# Patient Record
Sex: MALE | Race: ASIAN | NOT HISPANIC OR LATINO | URBAN - METROPOLITAN AREA
[De-identification: names, ages, dates, MRNs, and addresses within clinical notes are randomized per-mention and may not be internally consistent; named-entity substitution may affect disease eponyms.]

---

## 2018-07-10 ENCOUNTER — HOSPITAL ENCOUNTER (OUTPATIENT)
Dept: RADIOLOGY | Facility: HOSPITAL | Age: 53
Discharge: HOME | End: 2018-07-10
Attending: INTERNAL MEDICINE
Payer: COMMERCIAL

## 2018-07-10 ENCOUNTER — OFFICE VISIT (OUTPATIENT)
Dept: INTERNAL MEDICINE | Facility: CLINIC | Age: 53
End: 2018-07-10
Payer: COMMERCIAL

## 2018-07-10 VITALS
HEART RATE: 63 BPM | HEIGHT: 70 IN | SYSTOLIC BLOOD PRESSURE: 104 MMHG | WEIGHT: 149.06 LBS | OXYGEN SATURATION: 99 % | DIASTOLIC BLOOD PRESSURE: 64 MMHG | BODY MASS INDEX: 21.34 KG/M2

## 2018-07-10 DIAGNOSIS — Z00.00 ROUTINE PHYSICAL EXAMINATION: Primary | ICD-10-CM

## 2018-07-10 DIAGNOSIS — K25.9 MULTIPLE GASTRIC ULCERS: ICD-10-CM

## 2018-07-10 DIAGNOSIS — Z00.00 ROUTINE PHYSICAL EXAMINATION: ICD-10-CM

## 2018-07-10 DIAGNOSIS — E78.5 DYSLIPIDEMIA: ICD-10-CM

## 2018-07-10 PROCEDURE — 71046 X-RAY EXAM CHEST 2 VIEWS: CPT | Mod: 26,,, | Performed by: RADIOLOGY

## 2018-07-10 PROCEDURE — 99999 PR PBB SHADOW E&M-NEW PATIENT-LVL III: CPT | Mod: PBBFAC,,, | Performed by: INTERNAL MEDICINE

## 2018-07-10 PROCEDURE — 71046 X-RAY EXAM CHEST 2 VIEWS: CPT | Mod: TC

## 2018-07-10 PROCEDURE — 99386 PREV VISIT NEW AGE 40-64: CPT | Mod: S$GLB,,, | Performed by: INTERNAL MEDICINE

## 2018-07-10 RX ORDER — PANTOPRAZOLE SODIUM 40 MG/1
40 TABLET, DELAYED RELEASE ORAL DAILY
Qty: 90 TABLET | Refills: 4 | Status: SHIPPED | OUTPATIENT
Start: 2018-07-10 | End: 2019-12-31

## 2018-07-10 RX ORDER — PANTOPRAZOLE SODIUM 40 MG/1
40 TABLET, DELAYED RELEASE ORAL DAILY
COMMUNITY
End: 2018-07-10 | Stop reason: SDUPTHER

## 2018-07-10 RX ORDER — ATORVASTATIN CALCIUM 20 MG/1
20 TABLET, FILM COATED ORAL DAILY
COMMUNITY
End: 2018-07-10 | Stop reason: SDUPTHER

## 2018-07-10 RX ORDER — ATORVASTATIN CALCIUM 20 MG/1
20 TABLET, FILM COATED ORAL DAILY
Qty: 90 TABLET | Refills: 4 | Status: SHIPPED | OUTPATIENT
Start: 2018-07-10 | End: 2019-12-31 | Stop reason: SDUPTHER

## 2018-07-11 NOTE — PROGRESS NOTES
Subjective:       Patient ID: Alonzo Turk is a 53 y.o. male.    Chief Complaint: Annual Exam    HPI:  53-year-old male comes in for visit to establish care and for general physical.  He is a 53-year-old [place]ian male new to our system.  I care for his sister and mother.  He is attended by his sister and brother-in-law although he does speak anguish very well.  He brings in records from his gastroenterologist which includes a colonoscopy and upper endoscopy.  He also brings in labs from a the recent doctor's visit but that doctor has retired.  He says he feels well today but would like some follow-up.  He is 53 years old and had an upper and lower endoscopy in the last 2 months.  The lower scope was for screening and apparently showed a benign polyp.  I trying to get the records but he was told his next colonoscopy would be in 10 years.  I suspect it was a hyperplastic polyp but will try to get that record.  Of note is the patient was having questionable severity acid reflux or indigestion symptoms.  He rarely used [place] but did upper endoscopy was done which showed segmental ulceration and nodularity of the mucosa of the stomach at the antrum.  These were compatible with erosive gastritis.  He also was noted to have multiple superficial ulcers in the duodenum ranging from 0.5 cm to 0.3 cm.  Patient and family tell me this was negative for H pylori but again I would like to see those reports.  He was placed on a PPI and told to stop anti-inflammatories and was supposed to have a 6 week follow-up with his gastroenterologist.  At that visit he was allegedly told he did not need another scope and the colonoscopy was in 10 years.  I would like to get those records because if he had several ulcers it may be worth repeating the scope to make sure they have healed.  He denies any other active or acute complaints.  He had fasting labs the 3 months ago and his glucose was slightly high at 104.  Kidney function was normal,  liver function normal.  Cholesterol was 141 with HDL 62, LDL 66.  This had apparently dropped from 170 prior starting Lipitor.  I will update some labs and place copies of these in his medical record.      Review of Systems   Constitutional: Negative for chills, fatigue, fever and unexpected weight change.   HENT: Negative for ear pain, nosebleeds, sore throat and trouble swallowing.    Eyes: Negative for pain and visual disturbance.   Respiratory: Negative for cough, shortness of breath and wheezing.    Cardiovascular: Negative for chest pain, palpitations and leg swelling.   Gastrointestinal: Negative for abdominal pain, constipation, diarrhea, nausea and vomiting.   Genitourinary: Negative for difficulty urinating, frequency and hematuria.   Musculoskeletal: Negative for arthralgias, back pain, myalgias, neck pain and neck stiffness.   Skin: Negative for rash and wound.   Neurological: Negative for dizziness, numbness and headaches.   Hematological: Negative for adenopathy. Does not bruise/bleed easily.   Psychiatric/Behavioral: Negative for dysphoric mood, sleep disturbance and suicidal ideas. The patient is not nervous/anxious.        Objective:      Physical Exam   Constitutional: He is oriented to person, place, and time. He appears well-developed and well-nourished. No distress.   Thin Lithuanian male in no acute distress   HENT:   Head: Normocephalic and atraumatic.   Right Ear: External ear normal.   Left Ear: External ear normal.   Mouth/Throat: Oropharynx is clear and moist. No oropharyngeal exudate.   TM's clear, pharynx clear   Eyes: Conjunctivae and EOM are normal. Pupils are equal, round, and reactive to light. No scleral icterus.   Neck: Normal range of motion. Neck supple. No thyromegaly present.   No supraclavicular nodes palpated   Cardiovascular: Normal rate, regular rhythm and normal heart sounds.    No murmur heard.  Pulmonary/Chest: Effort normal and breath sounds normal. He has no wheezes.    Abdominal: Soft. Bowel sounds are normal. He exhibits no mass. There is no tenderness.   Musculoskeletal: He exhibits no edema.   Lymphadenopathy:     He has no cervical adenopathy.   Neurological: He is alert and oriented to person, place, and time.   Skin: No rash noted. No erythema. No pallor.   Psychiatric: He has a normal mood and affect. His behavior is normal.   Vitals reviewed.      Assessment:       1. Routine physical examination    2. Multiple gastric ulcers    3. Dyslipidemia        Plan:       Alonzo was seen today for annual exam.    Diagnoses and all orders for this visit:    Routine physical examination  -     Lipid panel; Future  -     PSA, Screening; Future  -     CBC auto differential; Future  -     Comprehensive metabolic panel; Future  -     Hemoglobin A1c; Future  -     X-Ray Chest PA And Lateral; Future    Multiple gastric ulcers  -     X-Ray Chest PA And Lateral; Future    Dyslipidemia  -     X-Ray Chest PA And Lateral; Future    Other orders  -     pantoprazole (PROTONIX) 40 MG tablet; Take 1 tablet (40 mg total) by mouth once daily.  -     atorvastatin (LIPITOR) 20 MG tablet; Take 1 tablet (20 mg total) by mouth once daily.        Follow-up depending on above labs and chest x-ray.  I suggested he follow prior instructions to take PPI medication for 6 months.  We will decide on repeat after I reviewed the old records

## 2018-07-13 ENCOUNTER — LAB VISIT (OUTPATIENT)
Dept: LAB | Facility: HOSPITAL | Age: 53
End: 2018-07-13
Attending: INTERNAL MEDICINE
Payer: COMMERCIAL

## 2018-07-13 ENCOUNTER — TELEPHONE (OUTPATIENT)
Dept: INTERNAL MEDICINE | Facility: CLINIC | Age: 53
End: 2018-07-13

## 2018-07-13 DIAGNOSIS — Z00.00 ROUTINE PHYSICAL EXAMINATION: ICD-10-CM

## 2018-07-13 LAB
ALBUMIN SERPL BCP-MCNC: 4.1 G/DL
ALP SERPL-CCNC: 56 U/L
ALT SERPL W/O P-5'-P-CCNC: 35 U/L
ANION GAP SERPL CALC-SCNC: 7 MMOL/L
AST SERPL-CCNC: 28 U/L
BASOPHILS # BLD AUTO: 0.02 K/UL
BASOPHILS NFR BLD: 0.5 %
BILIRUB SERPL-MCNC: 1.1 MG/DL
BUN SERPL-MCNC: 10 MG/DL
CALCIUM SERPL-MCNC: 9.2 MG/DL
CHLORIDE SERPL-SCNC: 104 MMOL/L
CHOLEST SERPL-MCNC: 125 MG/DL
CHOLEST/HDLC SERPL: 2.8 {RATIO}
CO2 SERPL-SCNC: 29 MMOL/L
COMPLEXED PSA SERPL-MCNC: 0.56 NG/ML
CREAT SERPL-MCNC: 0.8 MG/DL
DIFFERENTIAL METHOD: ABNORMAL
EOSINOPHIL # BLD AUTO: 0.1 K/UL
EOSINOPHIL NFR BLD: 1.9 %
ERYTHROCYTE [DISTWIDTH] IN BLOOD BY AUTOMATED COUNT: 13.4 %
EST. GFR  (AFRICAN AMERICAN): >60 ML/MIN/1.73 M^2
EST. GFR  (NON AFRICAN AMERICAN): >60 ML/MIN/1.73 M^2
ESTIMATED AVG GLUCOSE: 111 MG/DL
GLUCOSE SERPL-MCNC: 98 MG/DL
HBA1C MFR BLD HPLC: 5.5 %
HCT VFR BLD AUTO: 42.5 %
HDLC SERPL-MCNC: 45 MG/DL
HDLC SERPL: 36 %
HGB BLD-MCNC: 14.4 G/DL
LDLC SERPL CALC-MCNC: 66.2 MG/DL
LYMPHOCYTES # BLD AUTO: 1.2 K/UL
LYMPHOCYTES NFR BLD: 30.4 %
MCH RBC QN AUTO: 29.9 PG
MCHC RBC AUTO-ENTMCNC: 33.9 G/DL
MCV RBC AUTO: 88 FL
MONOCYTES # BLD AUTO: 0.2 K/UL
MONOCYTES NFR BLD: 5.6 %
NEUTROPHILS # BLD AUTO: 2.3 K/UL
NEUTROPHILS NFR BLD: 61.6 %
NONHDLC SERPL-MCNC: 80 MG/DL
PLATELET # BLD AUTO: 207 K/UL
PMV BLD AUTO: 10.1 FL
POTASSIUM SERPL-SCNC: 4 MMOL/L
PROT SERPL-MCNC: 6.5 G/DL
RBC # BLD AUTO: 4.81 M/UL
SODIUM SERPL-SCNC: 140 MMOL/L
TRIGL SERPL-MCNC: 69 MG/DL
WBC # BLD AUTO: 3.78 K/UL

## 2018-07-13 PROCEDURE — 80053 COMPREHEN METABOLIC PANEL: CPT

## 2018-07-13 PROCEDURE — 85025 COMPLETE CBC W/AUTO DIFF WBC: CPT

## 2018-07-13 PROCEDURE — 80061 LIPID PANEL: CPT

## 2018-07-13 PROCEDURE — 84153 ASSAY OF PSA TOTAL: CPT

## 2018-07-13 PROCEDURE — 83036 HEMOGLOBIN GLYCOSYLATED A1C: CPT

## 2018-07-13 PROCEDURE — 36415 COLL VENOUS BLD VENIPUNCTURE: CPT

## 2018-07-17 ENCOUNTER — TELEPHONE (OUTPATIENT)
Dept: INTERNAL MEDICINE | Facility: CLINIC | Age: 53
End: 2018-07-17

## 2018-07-17 NOTE — TELEPHONE ENCOUNTER
Outside Gastro doctor rec C-scope in 5 years   No specific rec on EGD. Suggested PPI for short duration then stop. Follow up if symptoms recur.     Will scan info to chart.

## 2018-07-20 ENCOUNTER — TELEPHONE (OUTPATIENT)
Dept: INTERNAL MEDICINE | Facility: CLINIC | Age: 53
End: 2018-07-20

## 2018-11-16 ENCOUNTER — HOSPITAL ENCOUNTER (OUTPATIENT)
Dept: RADIOLOGY | Facility: HOSPITAL | Age: 53
Discharge: HOME | End: 2018-11-16
Attending: INTERNAL MEDICINE
Payer: COMMERCIAL

## 2018-11-16 ENCOUNTER — OFFICE VISIT (OUTPATIENT)
Dept: INTERNAL MEDICINE | Facility: CLINIC | Age: 53
End: 2018-11-16
Payer: COMMERCIAL

## 2018-11-16 VITALS
BODY MASS INDEX: 21.95 KG/M2 | OXYGEN SATURATION: 99 % | DIASTOLIC BLOOD PRESSURE: 80 MMHG | HEART RATE: 67 BPM | WEIGHT: 153 LBS | SYSTOLIC BLOOD PRESSURE: 120 MMHG

## 2018-11-16 DIAGNOSIS — K25.9 MULTIPLE GASTRIC ULCERS: Primary | ICD-10-CM

## 2018-11-16 DIAGNOSIS — M54.9 UPPER BACK PAIN: ICD-10-CM

## 2018-11-16 DIAGNOSIS — R20.0 LEFT ARM NUMBNESS: ICD-10-CM

## 2018-11-16 DIAGNOSIS — E78.5 DYSLIPIDEMIA: ICD-10-CM

## 2018-11-16 DIAGNOSIS — M54.2 NECK PAIN: ICD-10-CM

## 2018-11-16 PROCEDURE — 99214 OFFICE O/P EST MOD 30 MIN: CPT | Mod: S$GLB,,, | Performed by: INTERNAL MEDICINE

## 2018-11-16 PROCEDURE — 72040 X-RAY EXAM NECK SPINE 2-3 VW: CPT | Mod: TC

## 2018-11-16 PROCEDURE — 99999 PR PBB SHADOW E&M-EST. PATIENT-LVL III: CPT | Mod: PBBFAC,,, | Performed by: INTERNAL MEDICINE

## 2018-11-16 PROCEDURE — 72070 X-RAY EXAM THORAC SPINE 2VWS: CPT | Mod: 26,,, | Performed by: RADIOLOGY

## 2018-11-16 PROCEDURE — 3008F BODY MASS INDEX DOCD: CPT | Mod: CPTII,S$GLB,, | Performed by: INTERNAL MEDICINE

## 2018-11-16 PROCEDURE — 72070 X-RAY EXAM THORAC SPINE 2VWS: CPT | Mod: TC

## 2018-11-16 PROCEDURE — 72040 X-RAY EXAM NECK SPINE 2-3 VW: CPT | Mod: 26,,, | Performed by: RADIOLOGY

## 2018-11-16 NOTE — PROGRESS NOTES
Subjective:       Patient ID: Alonzo Turk is a 53 y.o. male.    Chief Complaint: Follow-up    HPI:  Patient comes in follow-up gastric ulcers.  He also is having some left upper back discomfort with mild pain and numbness that radiates to the left arm.  He had an outside scope but a copy is placed in our records here.  Showed multiple gastric ulcers.  He has been on Protonix for nearly 6 months and it was suggested that he repeat the scope.  He did not have symptoms beforehand as a comparison of symptoms so it is difficult to reliably say whether he is symptomatic or not.  He denies any GI or  complaints.  No emesis or dysphagia.  A he does not remember injuring his back or arm at work.  The symptoms seemed to radiate from approximately cervical 7 or 8 region in the left upper back down to the arm.  No trauma.  Normal  and strength.  He has not tried treating this in any way      Review of Systems   Constitutional: Negative for chills, fatigue, fever and unexpected weight change.   HENT: Negative for nosebleeds and trouble swallowing.    Eyes: Negative for pain and visual disturbance.   Respiratory: Negative for cough, shortness of breath and wheezing.    Cardiovascular: Negative for chest pain and palpitations.   Gastrointestinal: Negative for abdominal pain, constipation, diarrhea, nausea and vomiting.   Genitourinary: Negative for difficulty urinating and hematuria.   Musculoskeletal: Positive for arthralgias, back pain and myalgias. Negative for neck pain.   Skin: Negative for rash.   Neurological: Positive for numbness. Negative for dizziness, weakness and headaches.   Hematological: Does not bruise/bleed easily.   Psychiatric/Behavioral: Negative for dysphoric mood, sleep disturbance and suicidal ideas.       Objective:      Physical Exam   Constitutional: He is oriented to person, place, and time. He appears well-developed and well-nourished. No distress.   HENT:   Head: Normocephalic and atraumatic.    Right Ear: External ear normal.   Left Ear: External ear normal.   Mouth/Throat: Oropharynx is clear and moist. No oropharyngeal exudate.   TM's clear, pharynx clear   Eyes: Conjunctivae and EOM are normal. Pupils are equal, round, and reactive to light. No scleral icterus.   Neck: Normal range of motion. Neck supple. No thyromegaly present.   No supraclavicular nodes palpated   Cardiovascular: Normal rate, regular rhythm and normal heart sounds.   No murmur heard.  Pulmonary/Chest: Effort normal and breath sounds normal. He has no wheezes.   Abdominal: Soft. Bowel sounds are normal. He exhibits no mass. There is no tenderness. There is no guarding.   Musculoskeletal: He exhibits tenderness. He exhibits no edema or deformity.        Arms:  Area involved red has mild discomfort, mild tenderness to deep palpation of the upper back but fair range of motion of the shoulder elbow and wrist.  Good  strength.  No clear sensory deficit but patient says it feels numb   Lymphadenopathy:     He has no cervical adenopathy.   Neurological: He is alert and oriented to person, place, and time.   Skin: No rash noted. No erythema. No pallor.   Psychiatric: He has a normal mood and affect. His behavior is normal.       Assessment:       1. Multiple gastric ulcers    2. Dyslipidemia    3. Neck pain    4. Upper back pain    5. Left arm numbness        Plan:       Alonzo was seen today for follow-up.    Diagnoses and all orders for this visit:    Multiple gastric ulcers  -     Case request GI: ESOPHAGOGASTRODUODENOSCOPY (EGD)    Dyslipidemia    Neck pain  -     X-Ray Cervical Spine AP And Lateral; Future  -     X-Ray Thoracic Spine AP Lateral; Future    Upper back pain  -     X-Ray Cervical Spine AP And Lateral; Future  -     X-Ray Thoracic Spine AP Lateral; Future    Left arm numbness  -     X-Ray Cervical Spine AP And Lateral; Future  -     X-Ray Thoracic Spine AP Lateral; Future        trial of heat, Tylenol, massage, rest.  Call  if not improving

## 2018-11-19 ENCOUNTER — TELEPHONE (OUTPATIENT)
Dept: INTERNAL MEDICINE | Facility: CLINIC | Age: 53
End: 2018-11-19

## 2018-11-19 DIAGNOSIS — R20.0 LEFT ARM NUMBNESS: ICD-10-CM

## 2018-11-19 DIAGNOSIS — M79.602 LEFT ARM PAIN: Primary | ICD-10-CM

## 2018-11-20 NOTE — TELEPHONE ENCOUNTER
Please let pt or his sister know that his xrays did show mild arthritis. No fractures. If his left arm symptoms are continuing, we may wish to do further testing. Let me know of any questions.

## 2018-11-20 NOTE — TELEPHONE ENCOUNTER
----- Message from Antonia Delatorre sent at 11/20/2018 11:35 AM CST -----  Contact: Alonzo Turk [phone]  Patient is returning a phone call.  Who left a message for the patient: Audrey Lombard, LPN   Does patient know what this is regarding:  Test Results   Comments: Alonzo would like you guys to call his brother in-law Jem [phone]. Alonzo states the brother in-law handles all of their medical needs.

## 2018-11-20 NOTE — TELEPHONE ENCOUNTER
----- Message from Dina Ramirez sent at 11/20/2018  2:16 PM CST -----  Contact: self/[phone]  Patient is returning a phone call.  Who left a message for the patient: mian  Does patient know what this is regarding: results   Comments:

## 2018-11-21 NOTE — TELEPHONE ENCOUNTER
Pt informed. Pt requested me to give information to Jem his brother-in-law(He takes care of pt,s medical issues) and jem requested further testing and to schedule on any Friday.

## 2018-11-21 NOTE — TELEPHONE ENCOUNTER
Yes, pt continues with pain, maybe you were going to order a ct scan or another test  Or medication ??

## 2018-12-07 ENCOUNTER — TELEPHONE (OUTPATIENT)
Dept: INTERNAL MEDICINE | Facility: CLINIC | Age: 53
End: 2018-12-07

## 2018-12-07 ENCOUNTER — OFFICE VISIT (OUTPATIENT)
Dept: INTERNAL MEDICINE | Facility: CLINIC | Age: 53
End: 2018-12-07
Payer: COMMERCIAL

## 2018-12-07 VITALS
DIASTOLIC BLOOD PRESSURE: 80 MMHG | BODY MASS INDEX: 21.18 KG/M2 | HEART RATE: 77 BPM | SYSTOLIC BLOOD PRESSURE: 116 MMHG | OXYGEN SATURATION: 98 % | HEIGHT: 70 IN | WEIGHT: 147.94 LBS

## 2018-12-07 DIAGNOSIS — M54.12 CERVICAL RADICULOPATHY: Primary | ICD-10-CM

## 2018-12-07 DIAGNOSIS — M25.511 ACUTE PAIN OF RIGHT SHOULDER: ICD-10-CM

## 2018-12-07 DIAGNOSIS — M54.2 NECK PAIN: ICD-10-CM

## 2018-12-07 DIAGNOSIS — M79.602 LEFT ARM PAIN: ICD-10-CM

## 2018-12-07 PROCEDURE — 3008F BODY MASS INDEX DOCD: CPT | Mod: CPTII,S$GLB,, | Performed by: INTERNAL MEDICINE

## 2018-12-07 PROCEDURE — 99214 OFFICE O/P EST MOD 30 MIN: CPT | Mod: S$GLB,,, | Performed by: INTERNAL MEDICINE

## 2018-12-07 PROCEDURE — 99999 PR PBB SHADOW E&M-EST. PATIENT-LVL IV: CPT | Mod: PBBFAC,,, | Performed by: INTERNAL MEDICINE

## 2018-12-07 RX ORDER — GABAPENTIN 100 MG/1
100 CAPSULE ORAL 3 TIMES DAILY
Qty: 30 CAPSULE | Refills: 1 | Status: SHIPPED | OUTPATIENT
Start: 2018-12-07 | End: 2021-02-09

## 2018-12-07 NOTE — PROGRESS NOTES
Subjective:       Patient ID: Alonzo Turk is a 53 y.o. male.    Chief Complaint: Arm Pain    HPI:  53-year-old comes in for follow-up of neck pain and left arm numbness and tingling.  Unfortunately his nerve conduction study was not available to be scheduled until about 2 weeks from now and we tried to get that 2 weeks ago.  He try conservative measures and his neck x-ray showed some minor degenerative changes but not enough to cause symptoms into the left arm.  He mentions that last week he had right shoulder pain but it does not feel like the left.  It was with raising the arm or with movement and I suspect he strained the shoulder.  He does sometimes work as a .  He has a history of ulcers and we have discouraged anti-inflammatory use.  He has an upper GI scope for follow-up ulcers coming up in a few weeks.  We talked about trying gabapentin since this does seem neuropathic in nature.  He is not sure he wants to try that but will consider it.  At this point since the left-sided neck and arm burning type pain with numbness all the way down to the hand is persisting and possibly worsening I would like to have him get an MRI scan of the neck.  He and his family are in agreement      Review of Systems   Constitutional: Negative for activity change, chills, fatigue, fever and unexpected weight change.   HENT: Negative for hearing loss, nosebleeds, rhinorrhea and trouble swallowing.    Eyes: Negative for pain, discharge and visual disturbance.   Respiratory: Negative for cough, chest tightness, shortness of breath and wheezing.    Cardiovascular: Negative for chest pain and palpitations.   Gastrointestinal: Negative for abdominal pain, blood in stool, constipation, diarrhea, nausea and vomiting.   Endocrine: Negative for polydipsia and polyuria.   Genitourinary: Negative for difficulty urinating, hematuria and urgency.   Musculoskeletal: Positive for arthralgias (Right shoulder pain was short lived) and neck pain.    Skin: Negative for rash.   Neurological: Positive for weakness and numbness. Negative for dizziness and headaches.   Hematological: Does not bruise/bleed easily.   Psychiatric/Behavioral: Negative for confusion, dysphoric mood, sleep disturbance and suicidal ideas.       Objective:      Physical Exam   Constitutional: He is oriented to person, place, and time. He appears well-developed and well-nourished.   Neck:   Mild stiffness and tenderness on the left side of the neck and cervical muscles.   Cardiovascular: Regular rhythm and normal heart sounds.   Pulmonary/Chest: Effort normal and breath sounds normal.   Abdominal: Soft. Bowel sounds are normal. He exhibits no distension. There is no tenderness.   Musculoskeletal: He exhibits tenderness (Numbness type burning pain into the left arm but generalized, not to palpation.).   Fair range of motion of both shoulders   Neurological: He is alert and oriented to person, place, and time.   Skin: Skin is warm and dry. No rash noted.   Psychiatric: He has a normal mood and affect. His behavior is normal.       Assessment:       1. Cervical radiculopathy    2. Neck pain    3. Left arm pain    4. Acute pain of right shoulder        Plan:       Alonzo was seen today for arm pain.    Diagnoses and all orders for this visit:    Cervical radiculopathy  -     MRI Cervical Spine Without Contrast; Future    Neck pain  -     MRI Cervical Spine Without Contrast; Future    Left arm pain  -     MRI Cervical Spine Without Contrast; Future    Acute pain of right shoulder    Other orders  -     gabapentin (NEURONTIN) 100 MG capsule; Take 1 capsule (100 mg total) by mouth 3 (three) times daily.

## 2018-12-07 NOTE — TELEPHONE ENCOUNTER
Can we check to see if Neurology has sooner EMG tests than February, or did the patient choose to wait this long for some reason?

## 2018-12-08 ENCOUNTER — HOSPITAL ENCOUNTER (OUTPATIENT)
Dept: RADIOLOGY | Facility: HOSPITAL | Age: 53
Discharge: HOME | End: 2018-12-08
Attending: INTERNAL MEDICINE
Payer: COMMERCIAL

## 2018-12-08 DIAGNOSIS — M79.602 LEFT ARM PAIN: ICD-10-CM

## 2018-12-08 DIAGNOSIS — M54.2 NECK PAIN: ICD-10-CM

## 2018-12-08 DIAGNOSIS — M54.12 CERVICAL RADICULOPATHY: ICD-10-CM

## 2018-12-08 PROCEDURE — 72141 MRI NECK SPINE W/O DYE: CPT | Mod: 26,,, | Performed by: RADIOLOGY

## 2018-12-08 PROCEDURE — 72141 MRI NECK SPINE W/O DYE: CPT | Mod: TC

## 2018-12-10 ENCOUNTER — PATIENT MESSAGE (OUTPATIENT)
Dept: INTERNAL MEDICINE | Facility: CLINIC | Age: 53
End: 2018-12-10

## 2018-12-10 ENCOUNTER — TELEPHONE (OUTPATIENT)
Dept: INTERNAL MEDICINE | Facility: CLINIC | Age: 53
End: 2018-12-10

## 2018-12-10 DIAGNOSIS — R20.0 LEFT ARM NUMBNESS: ICD-10-CM

## 2018-12-10 DIAGNOSIS — M54.12 CERVICAL RADICULOPATHY AT C6: Primary | ICD-10-CM

## 2018-12-10 DIAGNOSIS — R93.7 ABNORMAL MRI, CERVICAL SPINE: ICD-10-CM

## 2018-12-10 RX ORDER — METHYLPREDNISOLONE 4 MG/1
TABLET ORAL
Qty: 1 PACKAGE | Refills: 0 | Status: ON HOLD | OUTPATIENT
Start: 2018-12-10 | End: 2019-01-04 | Stop reason: HOSPADM

## 2018-12-10 NOTE — TELEPHONE ENCOUNTER
"Please let pt or his sister know that his MRI shows "mild" nerve impingement at C6-C7. It doesn't look bad on MRI but I know his symptoms are persisting. 1 option would be to try a course of medication like a steroid pack (by mouth)  or we could have him see a specialist of the neck/spine to see what out options may be.     I placed the consult order but if they prefer to try the medrol I can send that in first. Just let me know.   And probably worth keeping the ENG appointment.       Thanks.   "

## 2018-12-21 ENCOUNTER — PROCEDURE VISIT (OUTPATIENT)
Dept: NEUROLOGY | Facility: CLINIC | Age: 53
End: 2018-12-21
Payer: COMMERCIAL

## 2018-12-21 DIAGNOSIS — R20.0 LEFT ARM NUMBNESS: ICD-10-CM

## 2018-12-21 DIAGNOSIS — M79.602 LEFT ARM PAIN: ICD-10-CM

## 2018-12-21 PROCEDURE — 95910 NRV CNDJ TEST 7-8 STUDIES: CPT | Mod: S$GLB,,, | Performed by: NEUROMUSCULOSKELETAL MEDICINE & OMM

## 2018-12-21 PROCEDURE — 95886 MUSC TEST DONE W/N TEST COMP: CPT | Mod: S$GLB,,, | Performed by: NEUROMUSCULOSKELETAL MEDICINE & OMM

## 2019-01-04 ENCOUNTER — HOSPITAL ENCOUNTER (OUTPATIENT)
Facility: HOSPITAL | Age: 54
Discharge: HOME | End: 2019-01-04
Attending: INTERNAL MEDICINE | Admitting: INTERNAL MEDICINE
Payer: COMMERCIAL

## 2019-01-04 ENCOUNTER — ANESTHESIA (OUTPATIENT)
Dept: ENDOSCOPY | Facility: HOSPITAL | Age: 54
End: 2019-01-04
Payer: COMMERCIAL

## 2019-01-04 ENCOUNTER — ANESTHESIA EVENT (OUTPATIENT)
Dept: ENDOSCOPY | Facility: HOSPITAL | Age: 54
End: 2019-01-04
Payer: COMMERCIAL

## 2019-01-04 VITALS
DIASTOLIC BLOOD PRESSURE: 69 MMHG | BODY MASS INDEX: 21.47 KG/M2 | HEART RATE: 78 BPM | TEMPERATURE: 98 F | WEIGHT: 150 LBS | RESPIRATION RATE: 18 BRPM | OXYGEN SATURATION: 99 % | HEIGHT: 70 IN | SYSTOLIC BLOOD PRESSURE: 106 MMHG

## 2019-01-04 DIAGNOSIS — Z87.11 HISTORY OF GASTRIC ULCER: ICD-10-CM

## 2019-01-04 DIAGNOSIS — K25.9 MULTIPLE GASTRIC ULCERS: Primary | ICD-10-CM

## 2019-01-04 PROCEDURE — 43235 PR EGD, FLEX, DIAGNOSTIC: ICD-10-PCS | Mod: ,,, | Performed by: INTERNAL MEDICINE

## 2019-01-04 PROCEDURE — 37000008 HC ANESTHESIA 1ST 15 MINUTES: Performed by: INTERNAL MEDICINE

## 2019-01-04 PROCEDURE — E9220 PRA ENDO ANESTHESIA: ICD-10-PCS | Mod: ,,, | Performed by: NURSE ANESTHETIST, CERTIFIED REGISTERED

## 2019-01-04 PROCEDURE — 43235 EGD DIAGNOSTIC BRUSH WASH: CPT | Performed by: INTERNAL MEDICINE

## 2019-01-04 PROCEDURE — 37000009 HC ANESTHESIA EA ADD 15 MINS: Performed by: INTERNAL MEDICINE

## 2019-01-04 PROCEDURE — E9220 PRA ENDO ANESTHESIA: HCPCS | Mod: ,,, | Performed by: NURSE ANESTHETIST, CERTIFIED REGISTERED

## 2019-01-04 PROCEDURE — 25000003 PHARM REV CODE 250: Performed by: NURSE ANESTHETIST, CERTIFIED REGISTERED

## 2019-01-04 PROCEDURE — 25000003 PHARM REV CODE 250: Performed by: INTERNAL MEDICINE

## 2019-01-04 PROCEDURE — 43235 EGD DIAGNOSTIC BRUSH WASH: CPT | Mod: ,,, | Performed by: INTERNAL MEDICINE

## 2019-01-04 PROCEDURE — 63600175 PHARM REV CODE 636 W HCPCS: Performed by: NURSE ANESTHETIST, CERTIFIED REGISTERED

## 2019-01-04 RX ORDER — SODIUM CHLORIDE 9 MG/ML
INJECTION, SOLUTION INTRAVENOUS CONTINUOUS PRN
Status: DISCONTINUED | OUTPATIENT
Start: 2019-01-04 | End: 2019-01-04

## 2019-01-04 RX ORDER — GLYCOPYRROLATE 0.2 MG/ML
INJECTION INTRAMUSCULAR; INTRAVENOUS
Status: DISCONTINUED | OUTPATIENT
Start: 2019-01-04 | End: 2019-01-04

## 2019-01-04 RX ORDER — SODIUM CHLORIDE 0.9 % (FLUSH) 0.9 %
3 SYRINGE (ML) INJECTION
Status: DISCONTINUED | OUTPATIENT
Start: 2019-01-04 | End: 2019-01-04 | Stop reason: HOSPADM

## 2019-01-04 RX ORDER — SODIUM CHLORIDE 9 MG/ML
INJECTION, SOLUTION INTRAVENOUS CONTINUOUS
Status: DISCONTINUED | OUTPATIENT
Start: 2019-01-04 | End: 2019-01-04 | Stop reason: HOSPADM

## 2019-01-04 RX ORDER — PROPOFOL 10 MG/ML
VIAL (ML) INTRAVENOUS
Status: DISCONTINUED | OUTPATIENT
Start: 2019-01-04 | End: 2019-01-04

## 2019-01-04 RX ORDER — LIDOCAINE HCL/PF 100 MG/5ML
SYRINGE (ML) INTRAVENOUS
Status: DISCONTINUED | OUTPATIENT
Start: 2019-01-04 | End: 2019-01-04

## 2019-01-04 RX ADMIN — PROPOFOL 30 MG: 10 INJECTION, EMULSION INTRAVENOUS at 02:01

## 2019-01-04 RX ADMIN — PROPOFOL 40 MG: 10 INJECTION, EMULSION INTRAVENOUS at 02:01

## 2019-01-04 RX ADMIN — LIDOCAINE HYDROCHLORIDE 50 MG: 20 INJECTION, SOLUTION INTRAVENOUS at 02:01

## 2019-01-04 RX ADMIN — SODIUM CHLORIDE: 0.9 INJECTION, SOLUTION INTRAVENOUS at 12:01

## 2019-01-04 RX ADMIN — PROPOFOL 70 MG: 10 INJECTION, EMULSION INTRAVENOUS at 02:01

## 2019-01-04 RX ADMIN — GLYCOPYRROLATE 0.2 MG: 0.2 INJECTION, SOLUTION INTRAMUSCULAR; INTRAVENOUS at 02:01

## 2019-01-04 RX ADMIN — SODIUM CHLORIDE: 0.9 INJECTION, SOLUTION INTRAVENOUS at 02:01

## 2019-01-04 NOTE — [employer]OVATION PATIENT INSTRUCTIONS
Discharge Summary/Instructions after an Endoscopic Procedure  Patient Name: Alonzo Turk  Patient MRN: [id number]  Patient YOB: 1965 Friday, January 04, 2019  Kodak Freeman MD  RESTRICTIONS:  During your procedure today, you received medications for sedation.  These   medications may affect your judgment, balance and coordination.  Therefore,   for 24 hours, you have the following restrictions:   - DO NOT drive a car, operate machinery, make legal/financial decisions,   sign important papers or drink alcohol.    ACTIVITY:  Today: no heavy lifting, straining or running due to procedural   sedation/anesthesia.  The following day: return to full activity including work.  DIET:  Eat and drink normally unless instructed otherwise.     TREATMENT FOR COMMON SIDE EFFECTS:  - Mild abdominal pain, nausea, belching, bloating or excessive gas:  rest,   eat lightly and use a heating pad.  - Sore Throat: treat with throat lozenges and/or gargle with warm salt   water.  - Because air was used during the procedure, expelling large amounts of air   from your rectum or belching is normal.  - If a bowel prep was taken, you may not have a bowel movement for 1-3 days.    This is normal.  SYMPTOMS TO WATCH FOR AND REPORT TO YOUR PHYSICIAN:  1. Abdominal pain or bloating, other than gas cramps.  2. Chest pain.  3. Back pain.  4. Signs of infection such as: chills or fever occurring within 24 hours   after the procedure.  5. Rectal bleeding, which would show as bright red, maroon, or black stools.   (A tablespoon of blood from the rectum is not serious, especially if   hemorrhoids are present.)  6. Vomiting.  7. Weakness or dizziness.  GO DIRECTLY TO THE NEAREST EMERGENCY ROOM IF YOU HAVE ANY OF THE FOLLOWING:      Difficulty breathing              Chills and/or fever over 101 F   Persistent vomiting and/or vomiting blood   Severe abdominal pain   Severe chest pain   Black, tarry stools   Bleeding- more than one tablespoon   Any other  symptom or condition that you feel may need urgent attention  Your doctor recommends these additional instructions:  If any biopsies were taken, your doctors clinic will contact you in 1 to 2   weeks with any results.  - Patient has a contact number available for emergencies.  The signs and   symptoms of potential delayed complications were discussed with the   patient.  Return to normal activities tomorrow.  Written discharge   instructions were provided to the patient.   - Discharge patient to home.   - Return to referring physician as previously scheduled.   - The findings and recommendations were discussed with the designated   responsible adult.  For questions, problems or results please call your physician - Kodak Freeman MD at Work:  [phone].  [facility], EMERGENCY ROOM PHONE NUMBER: [phone]  IF A COMPLICATION OR EMERGENCY SITUATION ARISES AND YOU ARE UNABLE TO REACH   YOUR PHYSICIAN - GO DIRECTLY TO THE EMERGENCY ROOM.  Kodak Freeman MD  1/4/2019 2:16:12 PM  This report has been verified and signed electronically.  [employer]

## 2019-01-04 NOTE — H&P
Short Stay Endoscopy History and Physical    PCP - Benji Hudson MD     Procedure - EGD  ASA - per anesthesia  Mallampati - per anesthesia  History of Anesthesia problems - no  Family history Anesthesia problems -  no   Plan of anesthesia - General    HPI:  This is a 53 y.o. male here for evaluation of : follow-up peptic ulcer disease.    Previously underwent screening CRC and EGD for dysphagia (5/10/18). EGD notable for erosive gastritis and dudoenal ulcers (no pathology available from gastric biopsy). Colonoscopy with 1x 5mm sigmoid polyp (no pathology available). Referred for follow-up EGD from PCP.    Currently on daily PPI. Denies any complaints. Denies abdominal pain, n/v/d. Denies melena or hematochezia. Denies any other complaints.    No family history of malignancy.    Reflux - no  Dysphagia - no  Abdominal pain - no  Diarrhea - no    ROS:  Constitutional: No fevers, chills, No weight loss  CV: No chest pain  Pulm: No cough, No shortness of breath  Ophtho: No vision changes  GI: see HPI  Derm: No rash    Medical History:  has a past medical history of Gastric ulcer and Hyperlipidemia.    Surgical History:  has no past surgical history on file.    Family History: family history is not on file.. Otherwise no colon cancer, inflammatory bowel disease, or GI malignancies.    Social History:  reports that  has never smoked. he has never used smokeless tobacco. He reports that he does not drink alcohol.    Review of patient's allergies indicates:  No Known Allergies    Medications:   Medications Prior to Admission   Medication Sig Dispense Refill Last Dose    atorvastatin (LIPITOR) 20 MG tablet Take 1 tablet (20 mg total) by mouth once daily. 90 tablet 4 1/3/2019 at Unknown time    pantoprazole (PROTONIX) 40 MG tablet Take 1 tablet (40 mg total) by mouth once daily. 90 tablet 4 1/3/2019 at Unknown time    gabapentin (NEURONTIN) 100 MG capsule Take 1 capsule (100 mg total) by mouth 3 (three) times daily.  30 capsule 1 More than a month at Unknown time    methylPREDNISolone (MEDROL, ERNESTO,) 4 mg tablet use as directed 1 Package 0        Physical Exam:    Vital Signs:   Vitals:    01/04/19 1300   BP: 131/82   Pulse: 89   Resp: 16   Temp: 97 °F (36.1 °C)       General Appearance: Well appearing in no acute distress  Eyes:    No scleral icterus  ENT: Neck supple, Lips, mucosa, and tongue normal; teeth and gums normal  Lungs: CTA anteriorly  Heart:  Regular rate, S1, S2 normal, no murmurs heard.  Abdomen: Soft, non tender, non distended with normal bowel sounds. No hepatosplenomegaly, ascites, or mass.  Extremities: No edema  Skin: No rash    Labs:  Lab Results   Component Value Date    WBC 3.78 (L) 07/13/2018    HGB 14.4 07/13/2018    HCT 42.5 07/13/2018     07/13/2018    CHOL 125 07/13/2018    TRIG 69 07/13/2018    HDL 45 07/13/2018    ALT 35 07/13/2018    AST 28 07/13/2018     07/13/2018    K 4.0 07/13/2018     07/13/2018    CREATININE 0.8 07/13/2018    BUN 10 07/13/2018    CO2 29 07/13/2018    PSA 0.56 07/13/2018    HGBA1C 5.5 07/13/2018       I have explained the risks and benefits of endoscopy procedures to the patient including but not limited to bleeding, perforation, infection, and death.      Murphy Melton MD

## 2019-01-04 NOTE — ANESTHESIA POSTPROCEDURE EVALUATION
"Anesthesia Post Evaluation    Patient: Alonzo Turk    Procedure(s) Performed: Procedure(s) (LRB):  ESOPHAGOGASTRODUODENOSCOPY (EGD) (N/A)    Final Anesthesia Type: general  Patient location during evaluation: PACU  Patient participation: Yes- Able to Participate  Level of consciousness: awake and alert and oriented  Post-procedure vital signs: reviewed and stable  Pain management: adequate  Airway patency: patent  PONV status at discharge: No PONV  Anesthetic complications: no      Cardiovascular status: hemodynamically stable  Respiratory status: unassisted  Hydration status: euvolemic  Follow-up not needed.        Visit Vitals  /69 (BP Location: Left arm, Patient Position: Sitting)   Pulse 78   Temp 36.7 °C (98.1 °F) (Temporal)   Resp 18   Ht 5' 10" (1.778 m)   Wt 68 kg (150 lb)   SpO2 99%   BMI 21.52 kg/m²       Pain/Frank Score: Frank Score: 10 (1/4/2019  2:39 PM)        "

## 2019-01-04 NOTE — ANESTHESIA PREPROCEDURE EVALUATION
01/04/2019  Alonzo Turk is a 53 y.o., male.  Past Medical History:   Diagnosis Date    Gastric ulcer     Hyperlipidemia      History reviewed. No pertinent surgical history.    Anesthesia Evaluation    I have reviewed the Patient Summary Reports.    I have reviewed the Nursing Notes.   I have reviewed the Medications.     Review of Systems  Anesthesia Hx:  No problems with previous Anesthesia  Neg history of prior surgery. Denies Family Hx of Anesthesia complications.   Denies Personal Hx of Anesthesia complications.   Hematology/Oncology:  Hematology Normal   Oncology Normal     EENT/Dental:EENT/Dental Normal   Cardiovascular:  Cardiovascular Normal     Pulmonary:  Pulmonary Normal    Renal/:  Renal/ Normal     Hepatic/GI:   PUD, GERD    Musculoskeletal:  Musculoskeletal Normal    Neurological:  Neurology Normal    Endocrine:  Endocrine Normal    Dermatological:  Skin Normal    Psych:  Psychiatric Normal           Physical Exam  General:  Well nourished    Airway/Jaw/Neck:  Airway Findings: Mouth Opening: Normal Tongue: Normal  General Airway Assessment: Adult  Mallampati: II  Improves to II with phonation.  TM Distance: Normal, at least 6 cm        Eyes/Ears/Nose:  EYES/EARS/NOSE FINDINGS: Normal   Dental:  DENTAL FINDINGS: Normal   Chest/Lungs:  Chest/Lungs Findings: Clear to auscultation, Normal Respiratory Rate     Heart/Vascular:  Heart Findings: Rate: Normal  Rhythm: Regular Rhythm  Heart murmur: negative Vascular Findings: Normal    Abdomen:  Abdomen Findings: Normal    Musculoskeletal:  Musculoskeletal Findings: Normal   Skin:  Skin Findings: Normal    Mental Status:  Mental Status Findings: Normal        Anesthesia Plan  Type of Anesthesia, risks & benefits discussed:  Anesthesia Type:  general  Patient's Preference:   Intra-op Monitoring Plan: standard ASA monitors  Intra-op Monitoring Plan  Comments:   Post Op Pain Control Plan:   Post Op Pain Control Plan Comments:   Induction:   IV  Beta Blocker:  Patient is not currently on a Beta-Blocker (No further documentation required).       Informed Consent: Patient understands risks and agrees with Anesthesia plan.  Questions answered. Anesthesia consent signed with patient.  ASA Score: 2     Day of Surgery Review of History & Physical:    H&P update referred to the provider.         Ready For Surgery From Anesthesia Perspective.

## 2019-01-04 NOTE — PLAN OF CARE
Dr. Freeman at bedside reviewing procedure with pt. Discharge instructions given and explained to pt. Pt verbalizes understanding of instructions.

## 2019-01-04 NOTE — TRANSFER OF CARE
"Anesthesia Transfer of Care Note    Patient: Alonzo Turk    Procedure(s) Performed: Procedure(s) (LRB):  ESOPHAGOGASTRODUODENOSCOPY (EGD) (N/A)    Patient location: PACU    Anesthesia Type: general    Transport from OR: Transported from OR on 2-3 L/min O2 by NC with adequate spontaneous ventilation    Post pain: adequate analgesia    Post assessment: no apparent anesthetic complications and tolerated procedure well    Post vital signs: stable    Level of consciousness: sedated and responds to stimulation    Nausea/Vomiting: no nausea/vomiting    Complications: none    Transfer of care protocol was followed      Last vitals:   Visit Vitals  /82 (BP Location: Left arm, Patient Position: Lying)   Pulse 89   Temp 36.1 °C (97 °F) (Oral)   Resp 16   Ht 5' 10" (1.778 m)   Wt 68 kg (150 lb)   SpO2 98%   BMI 21.52 kg/m²     "

## 2019-01-04 NOTE — INTERVAL H&P NOTE
The patient has been examined and the H&P has been reviewed:    I concur with the findings and no changes have occurred since H&P was written.     History and Exam unchanged from visit.  Outside labs showed gastric and duodenal ulcers  bx negative for H pylori    Procedure - EGD  Neck - supple  Plan of anesthesia - General  ASA - per anesthesia  Mallampati - per anesthesia  Anesthesia problems - no  Family history of anesthesia problems - no      Anesthesia/Surgery risks, benefits and alternative options discussed and understood by patient/family.          Active Hospital Problems    Diagnosis  POA    History of gastric ulcer [Z87.19]  Not Applicable      Resolved Hospital Problems   No resolved problems to display.

## 2019-01-11 ENCOUNTER — TELEPHONE (OUTPATIENT)
Dept: ENDOSCOPY | Facility: HOSPITAL | Age: 54
End: 2019-01-11

## 2019-01-11 ENCOUNTER — OFFICE VISIT (OUTPATIENT)
Dept: INTERNAL MEDICINE | Facility: CLINIC | Age: 54
End: 2019-01-11
Payer: COMMERCIAL

## 2019-01-11 ENCOUNTER — TELEPHONE (OUTPATIENT)
Dept: INTERNAL MEDICINE | Facility: CLINIC | Age: 54
End: 2019-01-11

## 2019-01-11 VITALS
HEART RATE: 75 BPM | SYSTOLIC BLOOD PRESSURE: 118 MMHG | BODY MASS INDEX: 21.47 KG/M2 | WEIGHT: 150 LBS | DIASTOLIC BLOOD PRESSURE: 72 MMHG | HEIGHT: 70 IN | OXYGEN SATURATION: 98 %

## 2019-01-11 DIAGNOSIS — R94.131 ABNORMAL EMG: ICD-10-CM

## 2019-01-11 DIAGNOSIS — M54.12 CERVICAL RADICULOPATHY AT C7: Primary | ICD-10-CM

## 2019-01-11 DIAGNOSIS — K25.9 MULTIPLE GASTRIC ULCERS: ICD-10-CM

## 2019-01-11 PROCEDURE — 99214 PR OFFICE/OUTPT VISIT, EST, LEVL IV, 30-39 MIN: ICD-10-PCS | Mod: S$GLB,,, | Performed by: INTERNAL MEDICINE

## 2019-01-11 PROCEDURE — 99999 PR PBB SHADOW E&M-EST. PATIENT-LVL III: CPT | Mod: PBBFAC,,, | Performed by: INTERNAL MEDICINE

## 2019-01-11 PROCEDURE — 99214 OFFICE O/P EST MOD 30 MIN: CPT | Mod: S$GLB,,, | Performed by: INTERNAL MEDICINE

## 2019-01-11 PROCEDURE — 3008F BODY MASS INDEX DOCD: CPT | Mod: CPTII,S$GLB,, | Performed by: INTERNAL MEDICINE

## 2019-01-11 PROCEDURE — 99999 PR PBB SHADOW E&M-EST. PATIENT-LVL III: ICD-10-PCS | Mod: PBBFAC,,, | Performed by: INTERNAL MEDICINE

## 2019-01-11 PROCEDURE — 3008F PR BODY MASS INDEX (BMI) DOCUMENTED: ICD-10-PCS | Mod: CPTII,S$GLB,, | Performed by: INTERNAL MEDICINE

## 2019-01-11 NOTE — TELEPHONE ENCOUNTER
Message left for pt to stop medication after current refill and follow up as discussed.                   Gastro comments reviewed.

## 2019-01-11 NOTE — PROGRESS NOTES
Subjective:       Patient ID: Alonzo Turk is a 53 y.o. male.    Chief Complaint: Follow-up (2mo.)    Patient in for follow-up cervical radiculopathy and left arm symptoms along with follow-up of recent scope.  Patient has 2 issues including cervical radiculopathy with some pain and weakness.  He says it is much better and he quit taking the medicine.  His sister and brother-in-law would still like to see a specialist because his symptoms have not resolved and EMG showed chronic neurogenic atrophy at C6-7 consistent with radiculopathy and his MRI was abnormal in the same region.  Patient had EGD that showed complete resolution of his previous ulcers.  I spoke to the patient's gastroenterologist who did the scope and he suggested stopping the medicine even though it was not clear what triggered the ulcers in the 1st place.  He suggested observe for symptoms and do not repeat the scope unless there are symptoms.      Review of Systems   Constitutional: Negative for chills, fatigue, fever and unexpected weight change.   HENT: Negative for nosebleeds and trouble swallowing.    Eyes: Negative for pain and visual disturbance.   Respiratory: Negative for cough, shortness of breath and wheezing.    Cardiovascular: Negative for chest pain and palpitations.   Gastrointestinal: Negative for abdominal pain, constipation, diarrhea, nausea and vomiting.   Genitourinary: Negative for difficulty urinating and hematuria.   Musculoskeletal: Negative for neck pain.   Skin: Negative for rash.   Neurological: Positive for weakness ( left arm) and numbness (Left arm). Negative for dizziness and headaches.   Hematological: Does not bruise/bleed easily.   Psychiatric/Behavioral: Negative for dysphoric mood, sleep disturbance and suicidal ideas.       Objective:      Physical Exam   Constitutional: He is oriented to person, place, and time. He appears well-developed and well-nourished. No distress.   HENT:   Head: Normocephalic and atraumatic.    Right Ear: External ear normal.   Left Ear: External ear normal.   Mouth/Throat: Oropharynx is clear and moist. No oropharyngeal exudate.   TM's clear, pharynx clear   Eyes: Conjunctivae and EOM are normal. Pupils are equal, round, and reactive to light. No scleral icterus.   Neck: Normal range of motion. Neck supple. No thyromegaly present.   No supraclavicular nodes palpated   Cardiovascular: Normal rate, regular rhythm and normal heart sounds.   No murmur heard.  Pulmonary/Chest: Effort normal and breath sounds normal. He has no wheezes.   Abdominal: Soft. Bowel sounds are normal. He exhibits no mass. There is no tenderness.   Musculoskeletal: He exhibits no edema or tenderness.   Lymphadenopathy:     He has no cervical adenopathy.   Neurological: He is alert and oriented to person, place, and time. He exhibits abnormal muscle tone (Mild left arm weakness).   Skin: No rash noted. No erythema. No pallor.   Psychiatric: He has a normal mood and affect. His behavior is normal.       Assessment:       1. Cervical radiculopathy at C7    2. Abnormal EMG    3. Multiple gastric ulcers        Plan:       Alonzo was seen today for follow-up.    Diagnoses and all orders for this visit:    Cervical radiculopathy at C7  -     Ambulatory referral to Neurosurgery    Abnormal EMG  -     Ambulatory referral to Neurosurgery    Multiple gastric ulcers  Comments:  Presently resolved-I spoke to the gastroenterologist who did his scope any recommended a trial off the medicine.  I will discuss with the patient

## 2019-01-11 NOTE — TELEPHONE ENCOUNTER
----- Message from Kodak Freeman MD sent at 1/11/2019  3:04 PM CST -----  Yes I remember him and it was a bit unclear as to what caused the ulcers in the first place (I spoke with him about it after his scope but couldn't get an obvious cause).  But I think it is reasonable to go ahead and just stop the ppi and watch him. Wouldn't rescope just to rescope though.    Hope that is helpful  AR  ----- Message -----  From: Benji Hudson MD  Sent: 1/11/2019   2:58 PM  To: MD Dr Pete La, I wonder if you may be able to give me an opinion. THis pt had an outside scope 6 months prior and had duodenal and gastric ulcers (with no symptoms). It was done because h pilar was getting a C-scope. He took PPI 6 months and is totally clear on your scope.   SHould he continue PPI?  Should an EGD be repeated in the future if he does stop it? I was not clear how to advise the pt since he had no symptoms when the original ulcers were found?     Thanks for any thoughts,   Benji Hudson

## 2019-04-05 ENCOUNTER — OFFICE VISIT (OUTPATIENT)
Dept: INTERNAL MEDICINE | Facility: CLINIC | Age: 54
End: 2019-04-05
Payer: COMMERCIAL

## 2019-04-05 VITALS
OXYGEN SATURATION: 94 % | SYSTOLIC BLOOD PRESSURE: 120 MMHG | HEIGHT: 70 IN | DIASTOLIC BLOOD PRESSURE: 74 MMHG | WEIGHT: 151 LBS | HEART RATE: 84 BPM | BODY MASS INDEX: 21.62 KG/M2

## 2019-04-05 DIAGNOSIS — R42 VERTIGO: ICD-10-CM

## 2019-04-05 DIAGNOSIS — H92.02 LEFT EAR PAIN: Primary | ICD-10-CM

## 2019-04-05 DIAGNOSIS — R42 DIZZINESS: ICD-10-CM

## 2019-04-05 PROCEDURE — 99214 OFFICE O/P EST MOD 30 MIN: CPT | Mod: S$GLB,,, | Performed by: INTERNAL MEDICINE

## 2019-04-05 PROCEDURE — 99999 PR PBB SHADOW E&M-EST. PATIENT-LVL III: CPT | Mod: PBBFAC,,, | Performed by: INTERNAL MEDICINE

## 2019-04-05 PROCEDURE — 3008F BODY MASS INDEX DOCD: CPT | Mod: CPTII,S$GLB,, | Performed by: INTERNAL MEDICINE

## 2019-04-05 PROCEDURE — 3008F PR BODY MASS INDEX (BMI) DOCUMENTED: ICD-10-PCS | Mod: CPTII,S$GLB,, | Performed by: INTERNAL MEDICINE

## 2019-04-05 PROCEDURE — 99999 PR PBB SHADOW E&M-EST. PATIENT-LVL III: ICD-10-PCS | Mod: PBBFAC,,, | Performed by: INTERNAL MEDICINE

## 2019-04-05 PROCEDURE — 99214 PR OFFICE/OUTPT VISIT, EST, LEVL IV, 30-39 MIN: ICD-10-PCS | Mod: S$GLB,,, | Performed by: INTERNAL MEDICINE

## 2019-04-05 NOTE — PROGRESS NOTES
Subjective:       Patient ID: Alonzo Turk is a 54 y.o. male.    Chief Complaint: Otalgia (Left ear) and Dizziness    HPI patient comes in urgent care visit 2 reasons.  Is he says he notices he puts alcohol left ear there is pain. Says he has been doing this years and feels me to look.  He has noticed for the last 2 weeks when he goes on a [place] feels slightly dizzy he has noticed a few stairs at a computer for while than turns his head quickly might get a brief moment of dizziness he said only happens going up at the top of the bridge not at bottom or on the ground other than staring at a computer screen as he mentioned above..  He does not lose his balance.  He is not sure if he is slightly anxious but this has never happened previously.  He denies any other headaches, vision disturbances or hearing loss.  He gets a vision exam regularly.    Review of Systems   Constitutional: Negative for chills, fatigue, fever and unexpected weight change.   HENT: Positive for ear pain. Negative for nosebleeds and trouble swallowing.    Eyes: Negative for pain and visual disturbance.   Respiratory: Negative for cough, shortness of breath and wheezing.    Cardiovascular: Negative for chest pain and palpitations.   Gastrointestinal: Negative for abdominal pain, constipation, diarrhea, nausea and vomiting.   Genitourinary: Negative for difficulty urinating and hematuria.   Musculoskeletal: Negative for neck pain.   Skin: Negative for rash.   Neurological: Positive for dizziness and light-headedness. Negative for headaches.   Hematological: Does not bruise/bleed easily.   Psychiatric/Behavioral: Negative for dysphoric mood, sleep disturbance and suicidal ideas.       Objective:      Physical Exam   Constitutional: He is oriented to person, place, and time. He appears well-developed and well-nourished. No distress.   HENT:   Head: Normocephalic and atraumatic.   Right Ear: External ear normal.   Mouth/Throat: Oropharynx is clear  and moist. No oropharyngeal exudate.   There is some redness at the lower anterior aspect of the left ear drops that appears irritated near the canal wall.  Otherwise ears are unremarkable   Eyes: Pupils are equal, round, and reactive to light. Conjunctivae and EOM are normal. No scleral icterus.   No nystagmus noted   Neck: Normal range of motion. Neck supple. No thyromegaly present.   No supraclavicular nodes palpated   Cardiovascular: Normal rate, regular rhythm and normal heart sounds.   No murmur heard.  Pulmonary/Chest: Effort normal and breath sounds normal. He has no wheezes.   Abdominal: Soft. Bowel sounds are normal. He exhibits no mass. There is no tenderness.   Musculoskeletal: He exhibits no edema, tenderness or deformity.   Lymphadenopathy:     He has no cervical adenopathy.   Neurological: He is alert and oriented to person, place, and time. He displays normal reflexes. No cranial nerve deficit. He exhibits normal muscle tone. Coordination normal.   I cannot reproduce the dizziness or lightheadedness   Skin: No rash noted. No erythema. No pallor.   Psychiatric: He has a normal mood and affect. His behavior is normal.       Assessment:       1. Left ear pain    2. Dizziness    3. Vertigo        Plan:       Alonzo was seen today for otalgia and dizziness.    Diagnoses and all orders for this visit:    Left ear pain    Dizziness    Vertigo    Other orders  -     Discontinue: neomycin-colist-HC-thonzonium (COLY-MYCIN S) 3.3-3-10-0.5 mg/mL DrpS; Place 3 drops into both ears every 8 (eight) hours. for 7 days  -     neomycin-colist-HC-thonzonium (COLY-MYCIN S) 3.3-3-10-0.5 mg/mL DrpS; Place 3 drops into both ears every 8 (eight) hours. for 7 days          Normal neurologic exam.  Will try a topical antibiotic for the ear and see if that helps the sensitivity and or the dizziness although again he says this just lasts a few seconds.  Either fails to resolve, will consider ENT evaluation.  I did suggest an oral  antihistamine to see if that helps as well.

## 2019-05-03 ENCOUNTER — OFFICE VISIT (OUTPATIENT)
Dept: INTERNAL MEDICINE | Facility: CLINIC | Age: 54
End: 2019-05-03
Payer: COMMERCIAL

## 2019-05-03 VITALS
SYSTOLIC BLOOD PRESSURE: 110 MMHG | BODY MASS INDEX: 21.45 KG/M2 | DIASTOLIC BLOOD PRESSURE: 60 MMHG | WEIGHT: 149.5 LBS | HEART RATE: 80 BPM | OXYGEN SATURATION: 97 %

## 2019-05-03 DIAGNOSIS — H92.02 LEFT EAR PAIN: Primary | ICD-10-CM

## 2019-05-03 DIAGNOSIS — E78.5 DYSLIPIDEMIA: ICD-10-CM

## 2019-05-03 DIAGNOSIS — F41.9 ANXIETY: ICD-10-CM

## 2019-05-03 PROCEDURE — 3008F PR BODY MASS INDEX (BMI) DOCUMENTED: ICD-10-PCS | Mod: CPTII,S$GLB,, | Performed by: INTERNAL MEDICINE

## 2019-05-03 PROCEDURE — 99999 PR PBB SHADOW E&M-EST. PATIENT-LVL III: ICD-10-PCS | Mod: PBBFAC,,, | Performed by: INTERNAL MEDICINE

## 2019-05-03 PROCEDURE — 99213 PR OFFICE/OUTPT VISIT, EST, LEVL III, 20-29 MIN: ICD-10-PCS | Mod: S$GLB,,, | Performed by: INTERNAL MEDICINE

## 2019-05-03 PROCEDURE — 3008F BODY MASS INDEX DOCD: CPT | Mod: CPTII,S$GLB,, | Performed by: INTERNAL MEDICINE

## 2019-05-03 PROCEDURE — 99213 OFFICE O/P EST LOW 20 MIN: CPT | Mod: S$GLB,,, | Performed by: INTERNAL MEDICINE

## 2019-05-03 PROCEDURE — 99999 PR PBB SHADOW E&M-EST. PATIENT-LVL III: CPT | Mod: PBBFAC,,, | Performed by: INTERNAL MEDICINE

## 2019-05-03 NOTE — PROGRESS NOTES
Subjective:       Patient ID: Alonzo Turk is a 54 y.o. male.    Chief Complaint: Follow-up    HPI:  Patient comes in to discuss mild anxiety and follow-up left ear.  He also would like to get shingles vaccine.  I explained that the shingles vaccine is good to get but most pharmacies have been on back order for months.  I gave him a prescription to get it at his local [facility] if he can find.  He use the drops and says his ear feels fine.  He has no pain.  Also of note is that the patient said the dizziness seems to be better when he is going over bridges and he admits now he believes it is anxiety.  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  He does not want to take medication    Review of Systems   Constitutional: Negative for chills, fatigue, fever and unexpected weight change.   HENT: Negative for nosebleeds and trouble swallowing.    Eyes: Negative for pain and visual disturbance.   Respiratory: Negative for cough, shortness of breath and wheezing.    Cardiovascular: Negative for chest pain and palpitations.   Gastrointestinal: Negative for abdominal pain, constipation, diarrhea, nausea and vomiting.   Genitourinary: Negative for difficulty urinating and hematuria.   Musculoskeletal: Negative for neck pain.   Skin: Negative for rash.   Neurological: Positive for dizziness (Very slight). Negative for headaches.   Hematological: Does not bruise/bleed easily.   Psychiatric/Behavioral: Negative for dysphoric mood, sleep disturbance and suicidal ideas. The patient is nervous/anxious ( very slight primarily with driving at high speeds or over bridges-improving).        Objective:      Physical Exam   Constitutional: He is oriented to person, place, and time.   HENT:   Head: Normocephalic and atraumatic.   Right Ear: External ear normal.   Left Ear: External ear normal.   Nose: Nose normal.   Mouth/Throat: Oropharynx  is clear and moist.   Neck: Normal range of motion. Neck supple. No thyromegaly present.   Lymphadenopathy:     He has no cervical adenopathy.   Neurological: He is alert and oriented to person, place, and time. No cranial nerve deficit. Coordination normal.   Skin: Skin is warm and dry. Capillary refill takes less than 2 seconds.   Psychiatric: He has a normal mood and affect. His behavior is normal.       Assessment:       1. Left ear pain    2. Dyslipidemia    3. Anxiety        Plan:       Alonzo was seen today for follow-up.    Diagnoses and all orders for this visit:    Left ear pain  Comments:  Presently resolved with drops.     Dyslipidemia  Comments:  Stable. No changes- continue medication      Anxiety          Call if symptoms fail to improve or worsen.  Especially if anxiety or driving issues persist or recur

## 2019-12-13 ENCOUNTER — TELEPHONE (OUTPATIENT)
Dept: ADMINISTRATIVE | Facility: HOSPITAL | Age: 54
End: 2019-12-13

## 2019-12-13 ENCOUNTER — PATIENT OUTREACH (OUTPATIENT)
Dept: ADMINISTRATIVE | Facility: HOSPITAL | Age: 54
End: 2019-12-13

## 2019-12-13 DIAGNOSIS — Z12.5 PROSTATE CANCER SCREENING: Primary | ICD-10-CM

## 2019-12-13 DIAGNOSIS — Z00.00 ANNUAL PHYSICAL EXAM: Primary | ICD-10-CM

## 2019-12-13 DIAGNOSIS — Z00.00 ANNUAL PHYSICAL EXAM: ICD-10-CM

## 2019-12-13 NOTE — PROGRESS NOTES
VM left of need to call our office to schedule a fasting lab appointment. Links and Vaccines Updated.

## 2019-12-31 ENCOUNTER — OFFICE VISIT (OUTPATIENT)
Dept: INTERNAL MEDICINE | Facility: CLINIC | Age: 54
End: 2019-12-31
Payer: COMMERCIAL

## 2019-12-31 ENCOUNTER — LAB VISIT (OUTPATIENT)
Dept: LAB | Facility: HOSPITAL | Age: 54
End: 2019-12-31
Attending: INTERNAL MEDICINE
Payer: COMMERCIAL

## 2019-12-31 VITALS
OXYGEN SATURATION: 96 % | HEIGHT: 70 IN | DIASTOLIC BLOOD PRESSURE: 82 MMHG | SYSTOLIC BLOOD PRESSURE: 118 MMHG | HEART RATE: 78 BPM | WEIGHT: 159.19 LBS | BODY MASS INDEX: 22.79 KG/M2

## 2019-12-31 DIAGNOSIS — Z00.00 ROUTINE PHYSICAL EXAMINATION: Primary | ICD-10-CM

## 2019-12-31 DIAGNOSIS — Z00.00 ANNUAL PHYSICAL EXAM: ICD-10-CM

## 2019-12-31 DIAGNOSIS — E78.5 DYSLIPIDEMIA: ICD-10-CM

## 2019-12-31 DIAGNOSIS — M50.30 DDD (DEGENERATIVE DISC DISEASE), CERVICAL: ICD-10-CM

## 2019-12-31 DIAGNOSIS — Z12.5 PROSTATE CANCER SCREENING: ICD-10-CM

## 2019-12-31 DIAGNOSIS — Z87.11 HISTORY OF GASTRIC ULCER: ICD-10-CM

## 2019-12-31 LAB
ALBUMIN SERPL BCP-MCNC: 4 G/DL (ref 3.5–5.2)
ALP SERPL-CCNC: 55 U/L (ref 55–135)
ALT SERPL W/O P-5'-P-CCNC: 43 U/L (ref 10–44)
ANION GAP SERPL CALC-SCNC: 8 MMOL/L (ref 8–16)
AST SERPL-CCNC: 32 U/L (ref 10–40)
BASOPHILS # BLD AUTO: 0.02 K/UL (ref 0–0.2)
BASOPHILS NFR BLD: 0.4 % (ref 0–1.9)
BILIRUB SERPL-MCNC: 0.4 MG/DL (ref 0.1–1)
BUN SERPL-MCNC: 16 MG/DL (ref 6–20)
CALCIUM SERPL-MCNC: 9.2 MG/DL (ref 8.7–10.5)
CHLORIDE SERPL-SCNC: 103 MMOL/L (ref 95–110)
CHOLEST SERPL-MCNC: 169 MG/DL (ref 120–199)
CHOLEST/HDLC SERPL: 3.3 {RATIO} (ref 2–5)
CO2 SERPL-SCNC: 30 MMOL/L (ref 23–29)
COMPLEXED PSA SERPL-MCNC: 0.85 NG/ML (ref 0–4)
CREAT SERPL-MCNC: 1 MG/DL (ref 0.5–1.4)
DIFFERENTIAL METHOD: ABNORMAL
EOSINOPHIL # BLD AUTO: 0.1 K/UL (ref 0–0.5)
EOSINOPHIL NFR BLD: 1.8 % (ref 0–8)
ERYTHROCYTE [DISTWIDTH] IN BLOOD BY AUTOMATED COUNT: 12.9 % (ref 11.5–14.5)
EST. GFR  (AFRICAN AMERICAN): >60 ML/MIN/1.73 M^2
EST. GFR  (NON AFRICAN AMERICAN): >60 ML/MIN/1.73 M^2
ESTIMATED AVG GLUCOSE: 111 MG/DL (ref 68–131)
GLUCOSE SERPL-MCNC: 99 MG/DL (ref 70–110)
HBA1C MFR BLD HPLC: 5.5 % (ref 4–5.6)
HCT VFR BLD AUTO: 46 % (ref 40–54)
HDLC SERPL-MCNC: 52 MG/DL (ref 40–75)
HDLC SERPL: 30.8 % (ref 20–50)
HGB BLD-MCNC: 15 G/DL (ref 14–18)
LDLC SERPL CALC-MCNC: 90.2 MG/DL (ref 63–159)
LYMPHOCYTES # BLD AUTO: 1.7 K/UL (ref 1–4.8)
LYMPHOCYTES NFR BLD: 36.5 % (ref 18–48)
MCH RBC QN AUTO: 30.1 PG (ref 27–31)
MCHC RBC AUTO-ENTMCNC: 32.6 G/DL (ref 32–36)
MCV RBC AUTO: 92 FL (ref 82–98)
MONOCYTES # BLD AUTO: 0.2 K/UL (ref 0.3–1)
MONOCYTES NFR BLD: 5.3 % (ref 4–15)
NEUTROPHILS # BLD AUTO: 2.5 K/UL (ref 1.8–7.7)
NEUTROPHILS NFR BLD: 55.8 % (ref 38–73)
NONHDLC SERPL-MCNC: 117 MG/DL
NRBC BLD-RTO: 0 /100 WBC
PLATELET # BLD AUTO: 224 K/UL (ref 150–350)
PMV BLD AUTO: 9.8 FL (ref 9.2–12.9)
POTASSIUM SERPL-SCNC: 4.1 MMOL/L (ref 3.5–5.1)
PROT SERPL-MCNC: 6.9 G/DL (ref 6–8.4)
RBC # BLD AUTO: 4.99 M/UL (ref 4.6–6.2)
SODIUM SERPL-SCNC: 141 MMOL/L (ref 136–145)
TRIGL SERPL-MCNC: 134 MG/DL (ref 30–150)
WBC # BLD AUTO: 4.52 K/UL (ref 3.9–12.7)

## 2019-12-31 PROCEDURE — 36415 COLL VENOUS BLD VENIPUNCTURE: CPT

## 2019-12-31 PROCEDURE — 85025 COMPLETE CBC W/AUTO DIFF WBC: CPT

## 2019-12-31 PROCEDURE — 99999 PR PBB SHADOW E&M-EST. PATIENT-LVL III: CPT | Mod: PBBFAC,,, | Performed by: INTERNAL MEDICINE

## 2019-12-31 PROCEDURE — 99396 PR PREVENTIVE VISIT,EST,40-64: ICD-10-PCS | Mod: S$GLB,,, | Performed by: INTERNAL MEDICINE

## 2019-12-31 PROCEDURE — 80061 LIPID PANEL: CPT

## 2019-12-31 PROCEDURE — 99999 PR PBB SHADOW E&M-EST. PATIENT-LVL III: ICD-10-PCS | Mod: PBBFAC,,, | Performed by: INTERNAL MEDICINE

## 2019-12-31 PROCEDURE — 99396 PREV VISIT EST AGE 40-64: CPT | Mod: S$GLB,,, | Performed by: INTERNAL MEDICINE

## 2019-12-31 PROCEDURE — 80053 COMPREHEN METABOLIC PANEL: CPT

## 2019-12-31 PROCEDURE — 84153 ASSAY OF PSA TOTAL: CPT

## 2019-12-31 PROCEDURE — 83036 HEMOGLOBIN GLYCOSYLATED A1C: CPT

## 2019-12-31 RX ORDER — ATORVASTATIN CALCIUM 20 MG/1
20 TABLET, FILM COATED ORAL DAILY
Qty: 90 TABLET | Refills: 4 | Status: SHIPPED | OUTPATIENT
Start: 2019-12-31 | End: 2021-01-07

## 2019-12-31 NOTE — PROGRESS NOTES
Subjective:       Patient ID: Alonzo Turk is a 54 y.o. male.    Chief Complaint: Annual Exam    Patient for annual exam.  He says he is doing very well.  Neck has been doing well.  No active problems with his stomach.  He is not taking gabapentin or Protonix anymore.  Does continue atorvastatin.  He would like to update his labs.    Review of Systems   Constitutional: Negative for chills, fatigue, fever and unexpected weight change.   HENT: Negative for ear pain and sore throat.    Eyes: Negative for pain and visual disturbance.   Respiratory: Negative for cough, shortness of breath and wheezing.    Cardiovascular: Negative for chest pain and leg swelling.   Gastrointestinal: Negative for abdominal pain ( no epigastric are also symptoms in quite some time), constipation, diarrhea, nausea and vomiting.   Genitourinary: Negative for difficulty urinating, frequency and hematuria.   Musculoskeletal: Negative for arthralgias, back pain, myalgias, neck pain (No recent neck pain) and neck stiffness.   Skin: Negative for rash and wound.   Neurological: Negative for dizziness, numbness ( no symptoms into the arms or legs) and headaches.   Hematological: Negative for adenopathy.   Psychiatric/Behavioral: Negative for dysphoric mood. The patient is not nervous/anxious.        Objective:      Physical Exam   Constitutional: He is oriented to person, place, and time. He appears well-developed and well-nourished. No distress.   HENT:   Head: Normocephalic and atraumatic.   Right Ear: External ear normal.   Left Ear: External ear normal.   Mouth/Throat: Oropharynx is clear and moist. No oropharyngeal exudate.   TM's clear, pharynx clear   Eyes: Pupils are equal, round, and reactive to light. Conjunctivae and EOM are normal. No scleral icterus.   Neck: Normal range of motion. Neck supple. No thyromegaly present.   No supraclavicular nodes palpated   Cardiovascular: Normal rate, regular rhythm and normal heart sounds.   No murmur  heard.  Pulmonary/Chest: Effort normal and breath sounds normal. He has no wheezes.   Abdominal: Soft. Bowel sounds are normal. He exhibits no mass. There is no tenderness.   Musculoskeletal: He exhibits no edema.   Lymphadenopathy:     He has no cervical adenopathy.   Neurological: He is alert and oriented to person, place, and time.   Skin: No rash noted. No erythema. No pallor.   Psychiatric: He has a normal mood and affect. His behavior is normal.   Vitals reviewed.      Assessment:       1. Routine physical examination    2. Dyslipidemia    3. History of gastric ulcer    4. DDD (degenerative disc disease), cervical        Plan:       Alonzo was seen today for annual exam.    Diagnoses and all orders for this visit:    Routine physical examination    Dyslipidemia    History of gastric ulcer    DDD (degenerative disc disease), cervical    Other orders  -     atorvastatin (LIPITOR) 20 MG tablet; Take 1 tablet (20 mg total) by mouth once daily.          Review previously order labs.  Follow-up 6-12 months sooner p.r.n.

## 2020-01-02 ENCOUNTER — PATIENT MESSAGE (OUTPATIENT)
Dept: INTERNAL MEDICINE | Facility: CLINIC | Age: 55
End: 2020-01-02

## 2021-01-04 ENCOUNTER — PATIENT MESSAGE (OUTPATIENT)
Dept: ADMINISTRATIVE | Facility: HOSPITAL | Age: 56
End: 2021-01-04

## 2021-01-08 ENCOUNTER — TELEPHONE (OUTPATIENT)
Dept: INTERNAL MEDICINE | Facility: CLINIC | Age: 56
End: 2021-01-08

## 2021-02-09 ENCOUNTER — OFFICE VISIT (OUTPATIENT)
Dept: INTERNAL MEDICINE | Facility: CLINIC | Age: 56
End: 2021-02-09
Payer: COMMERCIAL

## 2021-02-09 ENCOUNTER — PATIENT MESSAGE (OUTPATIENT)
Dept: INTERNAL MEDICINE | Facility: CLINIC | Age: 56
End: 2021-02-09

## 2021-02-09 ENCOUNTER — HOSPITAL ENCOUNTER (OUTPATIENT)
Dept: RADIOLOGY | Facility: HOSPITAL | Age: 56
Discharge: HOME | End: 2021-02-09
Attending: INTERNAL MEDICINE
Payer: COMMERCIAL

## 2021-02-09 VITALS
DIASTOLIC BLOOD PRESSURE: 72 MMHG | WEIGHT: 155.19 LBS | HEIGHT: 70 IN | HEART RATE: 81 BPM | OXYGEN SATURATION: 97 % | SYSTOLIC BLOOD PRESSURE: 108 MMHG | BODY MASS INDEX: 22.22 KG/M2

## 2021-02-09 DIAGNOSIS — G89.29 CHRONIC NONINTRACTABLE HEADACHE, UNSPECIFIED HEADACHE TYPE: ICD-10-CM

## 2021-02-09 DIAGNOSIS — E78.5 DYSLIPIDEMIA: ICD-10-CM

## 2021-02-09 DIAGNOSIS — M50.30 DDD (DEGENERATIVE DISC DISEASE), CERVICAL: ICD-10-CM

## 2021-02-09 DIAGNOSIS — R42 DIZZINESS AND GIDDINESS: ICD-10-CM

## 2021-02-09 DIAGNOSIS — M79.602 LEFT ARM PAIN: ICD-10-CM

## 2021-02-09 DIAGNOSIS — R51.9 CHRONIC NONINTRACTABLE HEADACHE, UNSPECIFIED HEADACHE TYPE: ICD-10-CM

## 2021-02-09 DIAGNOSIS — Z12.5 SCREENING FOR PROSTATE CANCER: ICD-10-CM

## 2021-02-09 DIAGNOSIS — Z00.00 ROUTINE PHYSICAL EXAMINATION: Primary | ICD-10-CM

## 2021-02-09 DIAGNOSIS — K25.9 MULTIPLE GASTRIC ULCERS: ICD-10-CM

## 2021-02-09 PROCEDURE — 99396 PREV VISIT EST AGE 40-64: CPT | Mod: S$GLB,,, | Performed by: INTERNAL MEDICINE

## 2021-02-09 PROCEDURE — 3008F BODY MASS INDEX DOCD: CPT | Mod: CPTII,S$GLB,, | Performed by: INTERNAL MEDICINE

## 2021-02-09 PROCEDURE — 70450 CT HEAD/BRAIN W/O DYE: CPT | Mod: TC

## 2021-02-09 PROCEDURE — 3008F PR BODY MASS INDEX (BMI) DOCUMENTED: ICD-10-PCS | Mod: CPTII,S$GLB,, | Performed by: INTERNAL MEDICINE

## 2021-02-09 PROCEDURE — 1126F AMNT PAIN NOTED NONE PRSNT: CPT | Mod: S$GLB,,, | Performed by: INTERNAL MEDICINE

## 2021-02-09 PROCEDURE — 99999 PR PBB SHADOW E&M-EST. PATIENT-LVL III: ICD-10-PCS | Mod: PBBFAC,,, | Performed by: INTERNAL MEDICINE

## 2021-02-09 PROCEDURE — 99396 PR PREVENTIVE VISIT,EST,40-64: ICD-10-PCS | Mod: S$GLB,,, | Performed by: INTERNAL MEDICINE

## 2021-02-09 PROCEDURE — 70450 CT HEAD WITHOUT CONTRAST: ICD-10-PCS | Mod: 26,,, | Performed by: RADIOLOGY

## 2021-02-09 PROCEDURE — 1126F PR PAIN SEVERITY QUANTIFIED, NO PAIN PRESENT: ICD-10-PCS | Mod: S$GLB,,, | Performed by: INTERNAL MEDICINE

## 2021-02-09 PROCEDURE — 99999 PR PBB SHADOW E&M-EST. PATIENT-LVL III: CPT | Mod: PBBFAC,,, | Performed by: INTERNAL MEDICINE

## 2021-02-09 PROCEDURE — 70450 CT HEAD/BRAIN W/O DYE: CPT | Mod: 26,,, | Performed by: RADIOLOGY

## 2021-02-09 RX ORDER — ATORVASTATIN CALCIUM 20 MG/1
20 TABLET, FILM COATED ORAL DAILY
Qty: 90 TABLET | Refills: 4 | Status: SHIPPED | OUTPATIENT
Start: 2021-02-09 | End: 2022-03-30

## 2021-02-10 ENCOUNTER — PATIENT MESSAGE (OUTPATIENT)
Dept: INTERNAL MEDICINE | Facility: CLINIC | Age: 56
End: 2021-02-10

## 2021-04-16 ENCOUNTER — PATIENT MESSAGE (OUTPATIENT)
Dept: RESEARCH | Facility: HOSPITAL | Age: 56
End: 2021-04-16

## 2021-11-22 ENCOUNTER — PATIENT MESSAGE (OUTPATIENT)
Dept: INTERNAL MEDICINE | Facility: CLINIC | Age: 56
End: 2021-11-22

## 2021-11-22 ENCOUNTER — OFFICE VISIT (OUTPATIENT)
Dept: INTERNAL MEDICINE | Facility: CLINIC | Age: 56
End: 2021-11-22
Payer: COMMERCIAL

## 2021-11-22 ENCOUNTER — HOSPITAL ENCOUNTER (OUTPATIENT)
Dept: RADIOLOGY | Facility: HOSPITAL | Age: 56
Discharge: HOME | End: 2021-11-22
Attending: INTERNAL MEDICINE
Payer: COMMERCIAL

## 2021-11-22 VITALS
OXYGEN SATURATION: 98 % | HEART RATE: 84 BPM | SYSTOLIC BLOOD PRESSURE: 110 MMHG | WEIGHT: 152 LBS | HEIGHT: 70 IN | BODY MASS INDEX: 21.76 KG/M2 | DIASTOLIC BLOOD PRESSURE: 78 MMHG

## 2021-11-22 DIAGNOSIS — M79.671 RIGHT FOOT PAIN: ICD-10-CM

## 2021-11-22 DIAGNOSIS — R20.0 NUMBNESS OF RIGHT FOOT: ICD-10-CM

## 2021-11-22 DIAGNOSIS — M79.671 RIGHT FOOT PAIN: Primary | ICD-10-CM

## 2021-11-22 PROCEDURE — 73630 X-RAY EXAM OF FOOT: CPT | Mod: 26,RT,, | Performed by: RADIOLOGY

## 2021-11-22 PROCEDURE — 73630 X-RAY EXAM OF FOOT: CPT | Mod: TC,RT

## 2021-11-22 PROCEDURE — 99999 PR PBB SHADOW E&M-EST. PATIENT-LVL IV: CPT | Mod: PBBFAC,,, | Performed by: INTERNAL MEDICINE

## 2021-11-22 PROCEDURE — 99999 PR PBB SHADOW E&M-EST. PATIENT-LVL IV: ICD-10-PCS | Mod: PBBFAC,,, | Performed by: INTERNAL MEDICINE

## 2021-11-22 PROCEDURE — 99213 PR OFFICE/OUTPT VISIT, EST, LEVL III, 20-29 MIN: ICD-10-PCS | Mod: S$GLB,,, | Performed by: INTERNAL MEDICINE

## 2021-11-22 PROCEDURE — 99213 OFFICE O/P EST LOW 20 MIN: CPT | Mod: S$GLB,,, | Performed by: INTERNAL MEDICINE

## 2021-11-22 PROCEDURE — 73630 XR FOOT COMPLETE 3 VIEW RIGHT: ICD-10-PCS | Mod: 26,RT,, | Performed by: RADIOLOGY

## 2021-11-23 ENCOUNTER — PATIENT MESSAGE (OUTPATIENT)
Dept: INTERNAL MEDICINE | Facility: CLINIC | Age: 56
End: 2021-11-23
Payer: COMMERCIAL

## 2022-02-11 ENCOUNTER — PATIENT MESSAGE (OUTPATIENT)
Dept: INTERNAL MEDICINE | Facility: CLINIC | Age: 57
End: 2022-02-11
Payer: COMMERCIAL

## 2022-02-14 ENCOUNTER — PATIENT MESSAGE (OUTPATIENT)
Dept: INTERNAL MEDICINE | Facility: CLINIC | Age: 57
End: 2022-02-14
Payer: COMMERCIAL

## 2022-03-29 NOTE — TELEPHONE ENCOUNTER
Care Due:                  Date            Visit Type   Department     Provider  --------------------------------------------------------------------------------                                MYCHART                              FOLLOWUP/OF  [facility] INTERNAL  Last Visit: 11-      [facility] VISIT   MEDICINE       Benji Hudson  Next Visit: None Scheduled  None         None Found                                                            Last  Test          Frequency    Reason                     Performed    Due Date  --------------------------------------------------------------------------------    CMP.........  12 months..  atorvastatin.............  Not Found    Overdue    Lipid Panel.  12 months..  atorvastatin.............  02- 02-    Powered by [employer]. Reference number: [id number].   3/29/2022 11:21:01 AM CDT

## 2022-03-30 RX ORDER — ATORVASTATIN CALCIUM 20 MG/1
TABLET, FILM COATED ORAL
Qty: 90 TABLET | Refills: 0 | Status: SHIPPED | OUTPATIENT
Start: 2022-03-30 | End: 2022-06-26

## 2022-03-30 NOTE — TELEPHONE ENCOUNTER
This Rx Request does not qualify for assessment with the OR   Please review protocol details and the Care Due Message for extra clinical information    Reasons Rx Request may be deferred:  Labs/Vitals overdue    Note composed:3:11 PM 03/30/2022

## 2022-08-02 ENCOUNTER — PATIENT MESSAGE (OUTPATIENT)
Dept: INTERNAL MEDICINE | Facility: CLINIC | Age: 57
End: 2022-08-02
Payer: COMMERCIAL

## 2022-08-03 ENCOUNTER — OFFICE VISIT (OUTPATIENT)
Dept: URGENT CARE | Facility: CLINIC | Age: 57
End: 2022-08-03
Payer: COMMERCIAL

## 2022-08-03 VITALS
BODY MASS INDEX: 21.76 KG/M2 | SYSTOLIC BLOOD PRESSURE: 119 MMHG | WEIGHT: 152 LBS | TEMPERATURE: 99 F | OXYGEN SATURATION: 95 % | HEART RATE: 96 BPM | HEIGHT: 70 IN | DIASTOLIC BLOOD PRESSURE: 87 MMHG | RESPIRATION RATE: 16 BRPM

## 2022-08-03 DIAGNOSIS — R05.9 COUGH: ICD-10-CM

## 2022-08-03 DIAGNOSIS — U07.1 COVID-19: Primary | ICD-10-CM

## 2022-08-03 LAB
CTP QC/QA: YES
SARS-COV-2 RDRP RESP QL NAA+PROBE: POSITIVE

## 2022-08-03 PROCEDURE — U0002: ICD-10-PCS | Mod: QW,S$GLB,,

## 2022-08-03 PROCEDURE — 3074F PR MOST RECENT SYSTOLIC BLOOD PRESSURE < 130 MM HG: ICD-10-PCS | Mod: CPTII,S$GLB,,

## 2022-08-03 PROCEDURE — 99213 OFFICE O/P EST LOW 20 MIN: CPT | Mod: S$GLB,,,

## 2022-08-03 PROCEDURE — 1159F PR MEDICATION LIST DOCUMENTED IN MEDICAL RECORD: ICD-10-PCS | Mod: CPTII,S$GLB,,

## 2022-08-03 PROCEDURE — 3074F SYST BP LT 130 MM HG: CPT | Mod: CPTII,S$GLB,,

## 2022-08-03 PROCEDURE — U0002 COVID-19 LAB TEST NON-CDC: HCPCS | Mod: QW,S$GLB,,

## 2022-08-03 PROCEDURE — 3008F BODY MASS INDEX DOCD: CPT | Mod: CPTII,S$GLB,,

## 2022-08-03 PROCEDURE — 99213 PR OFFICE/OUTPT VISIT, EST, LEVL III, 20-29 MIN: ICD-10-PCS | Mod: S$GLB,,,

## 2022-08-03 PROCEDURE — 3079F DIAST BP 80-89 MM HG: CPT | Mod: CPTII,S$GLB,,

## 2022-08-03 PROCEDURE — 1160F PR REVIEW ALL MEDS BY PRESCRIBER/CLIN PHARMACIST DOCUMENTED: ICD-10-PCS | Mod: CPTII,S$GLB,,

## 2022-08-03 PROCEDURE — 1160F RVW MEDS BY RX/DR IN RCRD: CPT | Mod: CPTII,S$GLB,,

## 2022-08-03 PROCEDURE — 3008F PR BODY MASS INDEX (BMI) DOCUMENTED: ICD-10-PCS | Mod: CPTII,S$GLB,,

## 2022-08-03 PROCEDURE — 1159F MED LIST DOCD IN RCRD: CPT | Mod: CPTII,S$GLB,,

## 2022-08-03 PROCEDURE — 3079F PR MOST RECENT DIASTOLIC BLOOD PRESSURE 80-89 MM HG: ICD-10-PCS | Mod: CPTII,S$GLB,,

## 2022-08-03 NOTE — LETTER
[address] ? [place], [address] ? Phone [phone] ? Fax [phone]           Return to Work/School    Patient: Alonzo Turk  YOB: 1965   Date: 08/03/2022      To Whom It May Concern:     Alonzo Turk was in contact with/seen in my office on 08/03/2022. COVID-19 is present in our communities across the [facility]. Not all patients are eligible or appropriate to be tested. In this situation, your employee meets the following criteria:     Alonzo Turk has met the criteria for COVID-19 testing and has a POSITIVE result. He can return to work once they are asymptomatic for 24 hours without the use of fever reducing medications AND at least five days from the start of symptoms (or from the first positive result if they have no symptoms).      If you have any questions or concerns, or if I can be of further assistance, please do not hesitate to contact me.     Sincerely,          Daiana Chi PA-C

## 2022-08-03 NOTE — PATIENT INSTRUCTIONS
You have tested positive for COVID-19 today.      ISOLATION  If you tested positive and do not have symptoms, you must isolate for 5 days starting on the day of the positive test. I    If you tested positive and have symptoms, you must isolate for 5 days starting on the day of the first symptoms,  not the day of the positive test.     This is the most important part, both the CDC and the [facility] emphasize that you do not test out of isolation.     After 5 days, if your symptoms have improved and you have not had fever on day 5, you can return to the community on day 6- NO TESTING REQUIRED!      In fact, we do not retest if you were positive in the last 90 days.    After your 5 days of isolation are completed, the CDC recommends strict mask use for the first 5 days that you come out of isolation. PLEASE FOLLOW CDC GUIDELINES REGARDING TRAVEL AFTER COVID INFECTION.    Return to Urgent Care or go to ER if symptoms worsen or fail to improve.  Follow up with PCP as recommended for further management.     Your symptoms should begin to improve by Day 5 of the infection-- if symptoms worsen, you develop a new fever, shortness of breath, worsening shortness of breath with activity, chest pain, worsening cough, you must return to Urgent Care or go to the ER.    PLEASE READ YOUR DISCHARGE INSTRUCTIONS ENTIRELY AS IT CONTAINS IMPORTANT INFORMATION.      Please drink plenty of fluids.    Please get plenty of rest.    Please return here or go to the Emergency Department for any concerns or worsening of condition.      Tylenol or ibuprofen can also be used as directed for pain and fever unless you have an allergy to them or medical condition such as stomach ulcers, kidney or liver disease or blood thinners etc for which you should not be taking these type of medications.   YOU CAN ALTERNATE TYLENOL AND IBUPROFEN EVERY 3-4 HOURS. Take 1000mg (2 pills) of Extra Strength Acetaminophen (Tylenol) every 6 hours and 600mg (3 pills) of  Ibuprofen (Motrin/Advil) every 6 hours, alternating the two so that every 3 hours you take one or the other. Start with the Tylenol, then 3 hours later take the Ibuprofen, then 3 hours later take the Tylenol again, and so on.         For your allergy symptoms and/or runny nose, sinus/ear pressure, congestion:        - You can take over-the-counter claritin, zyrtec, allegra, or xyzal as directed. These are antihistamines that can help with runny nose, nasal congestion, sneezing, and helps to dry up post-nasal drip, which usually causes sore throat and cough.               - If you do NOT have high blood pressure, you may use a decongestant form (D)  of this medication (ie. Claritin- D, zyrtec-D, allegra-D) or if you do not take the D form, you can take sudafed (pseudoephedrine) over the counter, which is a decongestant. Do NOT take two decongestant (D) medications at the same time (such as mucinex-D and claritin-D or plain sudafed and claritin D). Dextromethorphan (DM) is a cough suppressant over the counter (ie. mucinex DM, robitussin, delsym; dayquil/nyquil has DM as well.)    -If you DO have high blood pressure, AVOID DECONGESTANTS (I.e., Phenylephrine and Pseudoephedrine). You may take Coricidin HBP for sinus congestion and Delsym for cough.        - You can take plain Mucinex (guaifenesin) 1200 mg twice a day to help loosen mucous.       Use over the counter flonase or nasocort: one spray each nostril twice daily OR two sprays each nostril once daily until nares dry out, unless you have Glaucoma.   If you find this dries your nose out or your nose bleeds, try using over the counter nasal saline a few minutes prior to using the flonase to moisten the lining of your nose and throughout the day as needed.   Flonase/nasal spray use directions:  1) Use once per day.  2) Blow nose first.  3) Close one nostril and spray flonase up the other nostril while inhaling gently.   4) If you inhale to aggressively, you will  have a bitter taste in the back of your mouth.       You can try breathe right strips at night to help you breathe.  A cool mist humidifier in bedroom may help with cough and relieve stuffy nose.     Sinus rinses DO NOT USE TAP WATER, if you must, water must be a rolling boil for 1 minute, let it cool, then use.  May use distilled water, or over the counter nasal saline rinses.  Vics vapor rub in shower to help open nasal passages.  May use nasal gel to keep passages moisturized.  May use Nasal saline sprays during the day for added relief of congestion.   For those who go to the gym, please do not use the sauna or steam room now to clear sinuses.      Sore throat recommendations: Warm fluids, warm salt water gargles, throat lozenges, tea, honey, soup, rest, hydration.      Cough     Honey with russell to soothe your throat    Robitussin or Delsyum for cough suppressant for dry cough.    Mucinex DM or products containing Guaifenesin or Dextromethorphan for expectorant (wet cough).    Take prescription cough meds (pills) as prescribed; take prescription cough syrup at night as needed for cough.  Do not take both the prescribed cough pills and syrup at the same time or within 6 hours of each other.  Do not take the cough syrup with any other sedative medication as it can can cause drowsiness. Do not operate any heavy machinery, drink or drive while taking the cough syrup.    Try taking half a dose first of the cough syrup to see how it affects you.       Please follow up with your primary care doctor or specialist in the next 48-72hrs as needed and if no improvement    If you  smoke, please stop smoking.      Please return or see your primary care doctor if you develop new or worsening symptoms.     Please arrange follow up with your primary medical clinic as soon as possible. You must understand that you've received an Urgent Care treatment only and that you may be released before all of your medical problems are  known or treated. You, the patient, will arrange for follow up as instructed. If your symptoms worsen or fail to improve you should go to the Emergency Room.

## 2022-08-03 NOTE — PROGRESS NOTES
"Subjective:       Patient ID: Alonzo Turk is a 57 y.o. male.    Vitals:  height is 5' 10" (1.778 m) and weight is 68.9 kg (152 lb). His temperature is 98.6 °F (37 °C). His blood pressure is 119/87 and his pulse is 96. His respiration is 16 and oxygen saturation is 95%.     Chief Complaint: COVID-19 Concerns (On Monday,8-1-2022 some symptoms develop like headache,soar throat,dry cough and body ache with chill. i did home test kit came positive - Entered by patient)    Pt presents a fever, chills, headache and a sore throat since Monday. Took an at home test that was positive  Denies CP, SOB, weakness, abdominal sx, and other associated complaints.      Fever   This is a new problem. The current episode started in the past 7 days. The problem occurs constantly. The problem has been unchanged. His temperature was unmeasured prior to arrival. Associated symptoms include congestion, headaches and a sore throat. Pertinent negatives include no abdominal pain, chest pain, coughing, diarrhea, ear pain, nausea, vomiting or wheezing. Treatments tried: Theraflu. The treatment provided moderate relief.       Constitution: Positive for fever. Negative for chills and fatigue.   HENT: Positive for congestion and sore throat. Negative for ear pain, ear discharge, postnasal drip, sinus pain and trouble swallowing.    Neck: Negative for neck pain and neck stiffness.   Cardiovascular: Negative for chest pain.   Eyes: Negative for eye pain.   Respiratory: Negative for cough, sputum production, shortness of breath and wheezing.    Gastrointestinal: Negative for abdominal pain, nausea, vomiting and diarrhea.   Neurological: Positive for headaches. Negative for dizziness.       Objective:      Physical Exam   Constitutional: He is oriented to person, place, and time. He appears well-developed. He is cooperative.  Non-toxic appearance. He does not appear ill. No distress.      Comments:Sitting comfortably in exam room, well appearing. " NAD  Able to talk in complete sentences without difficulty or pause       HENT:   Head: Normocephalic and atraumatic.   Ears:   Right Ear: Hearing and external ear normal.   Left Ear: Hearing and external ear normal.   Nose: No epistaxis.   Eyes: Conjunctivae and lids are normal. No scleral icterus.   Neck: Trachea normal and phonation normal. Neck supple. No edema present. No erythema present. No neck rigidity present.   Cardiovascular: Normal rate, regular rhythm, normal heart sounds and normal pulses.   Pulmonary/Chest: Effort normal and breath sounds normal. No respiratory distress. He has no decreased breath sounds. He has no rhonchi.   Abdominal: Normal appearance.   Musculoskeletal: Normal range of motion.         General: No deformity. Normal range of motion.   Neurological: He is alert and oriented to person, place, and time. He exhibits normal muscle tone. Coordination normal.   Skin: Skin is warm, dry, intact, not diaphoretic and not pale.   Psychiatric: His speech is normal and behavior is normal. Judgment and thought content normal.   Nursing note and vitals reviewed.    Results for orders placed or performed in visit on 08/03/22   POCT COVID-19 Rapid Screening   Result Value Ref Range    POC Rapid COVID Positive (A) Negative     Acceptable Yes            Assessment:       1. COVID-19    2. Cough          Plan:         Risk score of 1 . Discussed risks vs benefits of Paxlovid. Patient declined treatment with Paxlovid at this time and will continue with supportive care/OTC measures.     Reviewed positive covid test.  Discussed results with patient and proper quarantine based on CDC guidelines.   Discussed use of OTC medications for symptom control as this is a viral disease.   All ER precautions covered including but not limited to shortness of breath, intractable fever, or chest pain.  Discussed RTC if symptoms worsen, change, or persist.     Patient verbalized understanding and agreed  with the plan.     Daiana Chi PA-C        COVID-19    Cough  -     POCT COVID-19 Rapid Screening         Patient Instructions   You have tested positive for COVID-19 today.      ISOLATION  If you tested positive and do not have symptoms, you must isolate for 5 days starting on the day of the positive test. I    If you tested positive and have symptoms, you must isolate for 5 days starting on the day of the first symptoms,  not the day of the positive test.     This is the most important part, both the CDC and the [facility] emphasize that you do not test out of isolation.     After 5 days, if your symptoms have improved and you have not had fever on day 5, you can return to the community on day 6- NO TESTING REQUIRED!      In fact, we do not retest if you were positive in the last 90 days.    After your 5 days of isolation are completed, the CDC recommends strict mask use for the first 5 days that you come out of isolation. PLEASE FOLLOW CDC GUIDELINES REGARDING TRAVEL AFTER COVID INFECTION.     Return to Urgent Care or go to ER if symptoms worsen or fail to improve.  Follow up with PCP as recommended for further management.     Your symptoms should begin to improve by Day 5 of the infection-- if symptoms worsen, you develop a new fever, shortness of breath, worsening shortness of breath with activity, chest pain, worsening cough, you must return to Urgent Care or go to the ER.    PLEASE READ YOUR DISCHARGE INSTRUCTIONS ENTIRELY AS IT CONTAINS IMPORTANT INFORMATION.      Please drink plenty of fluids.    Please get plenty of rest.    Please return here or go to the Emergency Department for any concerns or worsening of condition.      Tylenol or ibuprofen can also be used as directed for pain and fever unless you have an allergy to them or medical condition such as stomach ulcers, kidney or liver disease or blood thinners etc for which you should not be taking these type of medications.   YOU CAN ALTERNATE TYLENOL  AND IBUPROFEN EVERY 3-4 HOURS. Take 1000mg (2 pills) of Extra Strength Acetaminophen (Tylenol) every 6 hours and 600mg (3 pills) of Ibuprofen (Motrin/Advil) every 6 hours, alternating the two so that every 3 hours you take one or the other. Start with the Tylenol, then 3 hours later take the Ibuprofen, then 3 hours later take the Tylenol again, and so on.         For your allergy symptoms and/or runny nose, sinus/ear pressure, congestion:        - You can take over-the-counter claritin, zyrtec, allegra, or xyzal as directed. These are antihistamines that can help with runny nose, nasal congestion, sneezing, and helps to dry up post-nasal drip, which usually causes sore throat and cough.               - If you do NOT have high blood pressure, you may use a decongestant form (D)  of this medication (ie. Claritin- D, zyrtec-D, allegra-D) or if you do not take the D form, you can take sudafed (pseudoephedrine) over the counter, which is a decongestant. Do NOT take two decongestant (D) medications at the same time (such as mucinex-D and claritin-D or plain sudafed and claritin D). Dextromethorphan (DM) is a cough suppressant over the counter (ie. mucinex DM, robitussin, delsym; dayquil/nyquil has DM as well.)    -If you DO have high blood pressure, AVOID DECONGESTANTS (I.e., Phenylephrine and Pseudoephedrine). You may take Coricidin HBP for sinus congestion and Delsym for cough.        - You can take plain Mucinex (guaifenesin) 1200 mg twice a day to help loosen mucous.       Use over the counter flonase or nasocort: one spray each nostril twice daily OR two sprays each nostril once daily until nares dry out, unless you have Glaucoma.   If you find this dries your nose out or your nose bleeds, try using over the counter nasal saline a few minutes prior to using the flonase to moisten the lining of your nose and throughout the day as needed.   Flonase/nasal spray use directions:  1) Use once per day.  2) Blow nose  first.  3) Close one nostril and spray flonase up the other nostril while inhaling gently.   4) If you inhale to aggressively, you will have a bitter taste in the back of your mouth.       You can try breathe right strips at night to help you breathe.  A cool mist humidifier in bedroom may help with cough and relieve stuffy nose.     Sinus rinses DO NOT USE TAP WATER, if you must, water must be a rolling boil for 1 minute, let it cool, then use.  May use distilled water, or over the counter nasal saline rinses.  Vics vapor rub in shower to help open nasal passages.  May use nasal gel to keep passages moisturized.  May use Nasal saline sprays during the day for added relief of congestion.   For those who go to the gym, please do not use the sauna or steam room now to clear sinuses.      Sore throat recommendations: Warm fluids, warm salt water gargles, throat lozenges, tea, honey, soup, rest, hydration.      Cough     Honey with russell to soothe your throat    Robitussin or Delsyum for cough suppressant for dry cough.    Mucinex DM or products containing Guaifenesin or Dextromethorphan for expectorant (wet cough).    Take prescription cough meds (pills) as prescribed; take prescription cough syrup at night as needed for cough.  Do not take both the prescribed cough pills and syrup at the same time or within 6 hours of each other.  Do not take the cough syrup with any other sedative medication as it can can cause drowsiness. Do not operate any heavy machinery, drink or drive while taking the cough syrup.    Try taking half a dose first of the cough syrup to see how it affects you.       Please follow up with your primary care doctor or specialist in the next 48-72hrs as needed and if no improvement    If you  smoke, please stop smoking.      Please return or see your primary care doctor if you develop new or worsening symptoms.     Please arrange follow up with your primary medical clinic as soon as possible. You must  understand that you've received an Urgent Care treatment only and that you may be released before all of your medical problems are known or treated. You, the patient, will arrange for follow up as instructed. If your symptoms worsen or fail to improve you should go to the Emergency Room.

## 2022-08-12 ENCOUNTER — PATIENT MESSAGE (OUTPATIENT)
Dept: INTERNAL MEDICINE | Facility: CLINIC | Age: 57
End: 2022-08-12
Payer: COMMERCIAL

## 2022-08-30 ENCOUNTER — HOSPITAL ENCOUNTER (OUTPATIENT)
Dept: RADIOLOGY | Facility: HOSPITAL | Age: 57
Discharge: HOME | End: 2022-08-30
Attending: INTERNAL MEDICINE
Payer: COMMERCIAL

## 2022-08-30 ENCOUNTER — OFFICE VISIT (OUTPATIENT)
Dept: INTERNAL MEDICINE | Facility: CLINIC | Age: 57
End: 2022-08-30
Payer: COMMERCIAL

## 2022-08-30 VITALS
DIASTOLIC BLOOD PRESSURE: 80 MMHG | OXYGEN SATURATION: 99 % | BODY MASS INDEX: 22.73 KG/M2 | SYSTOLIC BLOOD PRESSURE: 120 MMHG | WEIGHT: 158.75 LBS | HEIGHT: 70 IN | HEART RATE: 82 BPM

## 2022-08-30 DIAGNOSIS — M25.522 LEFT ELBOW PAIN: Primary | ICD-10-CM

## 2022-08-30 DIAGNOSIS — M25.522 LEFT ELBOW PAIN: ICD-10-CM

## 2022-08-30 DIAGNOSIS — Z87.11 HISTORY OF GASTRIC ULCER: ICD-10-CM

## 2022-08-30 PROCEDURE — 1159F PR MEDICATION LIST DOCUMENTED IN MEDICAL RECORD: ICD-10-PCS | Mod: CPTII,S$GLB,, | Performed by: INTERNAL MEDICINE

## 2022-08-30 PROCEDURE — 3074F PR MOST RECENT SYSTOLIC BLOOD PRESSURE < 130 MM HG: ICD-10-PCS | Mod: CPTII,S$GLB,, | Performed by: INTERNAL MEDICINE

## 2022-08-30 PROCEDURE — 3008F PR BODY MASS INDEX (BMI) DOCUMENTED: ICD-10-PCS | Mod: CPTII,S$GLB,, | Performed by: INTERNAL MEDICINE

## 2022-08-30 PROCEDURE — 99999 PR PBB SHADOW E&M-EST. PATIENT-LVL IV: CPT | Mod: PBBFAC,,, | Performed by: INTERNAL MEDICINE

## 2022-08-30 PROCEDURE — 3079F DIAST BP 80-89 MM HG: CPT | Mod: CPTII,S$GLB,, | Performed by: INTERNAL MEDICINE

## 2022-08-30 PROCEDURE — 1159F MED LIST DOCD IN RCRD: CPT | Mod: CPTII,S$GLB,, | Performed by: INTERNAL MEDICINE

## 2022-08-30 PROCEDURE — 3074F SYST BP LT 130 MM HG: CPT | Mod: CPTII,S$GLB,, | Performed by: INTERNAL MEDICINE

## 2022-08-30 PROCEDURE — 3079F PR MOST RECENT DIASTOLIC BLOOD PRESSURE 80-89 MM HG: ICD-10-PCS | Mod: CPTII,S$GLB,, | Performed by: INTERNAL MEDICINE

## 2022-08-30 PROCEDURE — 73070 X-RAY EXAM OF ELBOW: CPT | Mod: TC,LT

## 2022-08-30 PROCEDURE — 73070 X-RAY EXAM OF ELBOW: CPT | Mod: 26,LT,, | Performed by: INTERNAL MEDICINE

## 2022-08-30 PROCEDURE — 99214 PR OFFICE/OUTPT VISIT, EST, LEVL IV, 30-39 MIN: ICD-10-PCS | Mod: S$GLB,,, | Performed by: INTERNAL MEDICINE

## 2022-08-30 PROCEDURE — 1160F RVW MEDS BY RX/DR IN RCRD: CPT | Mod: CPTII,S$GLB,, | Performed by: INTERNAL MEDICINE

## 2022-08-30 PROCEDURE — 73070 XR ELBOW 2 VIEWS LEFT: ICD-10-PCS | Mod: 26,LT,, | Performed by: INTERNAL MEDICINE

## 2022-08-30 PROCEDURE — 1160F PR REVIEW ALL MEDS BY PRESCRIBER/CLIN PHARMACIST DOCUMENTED: ICD-10-PCS | Mod: CPTII,S$GLB,, | Performed by: INTERNAL MEDICINE

## 2022-08-30 PROCEDURE — 99999 PR PBB SHADOW E&M-EST. PATIENT-LVL IV: ICD-10-PCS | Mod: PBBFAC,,, | Performed by: INTERNAL MEDICINE

## 2022-08-30 PROCEDURE — 99214 OFFICE O/P EST MOD 30 MIN: CPT | Mod: S$GLB,,, | Performed by: INTERNAL MEDICINE

## 2022-08-30 PROCEDURE — 3008F BODY MASS INDEX DOCD: CPT | Mod: CPTII,S$GLB,, | Performed by: INTERNAL MEDICINE

## 2022-08-30 NOTE — PROGRESS NOTES
Subjective:       Patient ID: Alonzo Turk is a 57 y.o. male.   Chief Complaint: Arm Pain    HPI: Left arm dominant, pain in the left elbow for a few months. Ventral surface, lateral aspect, tender to palpation and flexion of the elbow against resistance. No injuries.   Tylenol and Advil no help and ice no help.   He previously had some left-sided neck and shoulder discomfort but that resolved.  He said this does not feel like the same thing.  Stomach has been stable.  No flare-up of ulcers or other conditions.    Review of Systems   Constitutional:  Negative for fever.   Musculoskeletal:  Positive for arthralgias. Negative for back pain, neck pain and neck stiffness.   Integumentary:  Negative for rash.        Objective:      Physical Exam  Constitutional:       Appearance: Normal appearance.   Musculoskeletal:         General: Tenderness (Ventral surface left elbow antecubital fossa laterally.) present.      Comments: Pain also reproduced with flexion at the elbow against resistance both with the palm up and palm down.  No masses noted.  No axillary masses noted   Neurological:      General: No focal deficit present.      Mental Status: He is alert and oriented to person, place, and time.      Sensory: No sensory deficit.      Coordination: Coordination normal.      Deep Tendon Reflexes: Reflexes normal.   Psychiatric:         Mood and Affect: Mood normal.         Behavior: Behavior normal.       Assessment:       Problem List Items Addressed This Visit          GI    History of gastric ulcer     Other Visit Diagnoses       Left elbow pain    -  Primary    Relevant Orders    X-Ray Elbow 2 Views Left    Ambulatory referral/consult to Orthopedics              Plan:       Alonzo was seen today for arm pain.    Diagnoses and all orders for this visit:    Left elbow pain  -     X-Ray Elbow 2 Views Left; Future  -     Ambulatory referral/consult to Orthopedics; Future    History of gastric ulcer         Patient asked me to  "do an MRI but I explained I would rather leave that to orthopedics to decide the best course of action after plain x-ray      Portions of this note may have been created with voice recognition software. Occasional "wrong-word" or "sound-a-like" substitutions may have occurred due to the inherent limitations of voice recognition software. Please, read the note carefully and recognize, using context, where substitutions have occurred.    "

## 2022-09-06 ENCOUNTER — OFFICE VISIT (OUTPATIENT)
Dept: ORTHOPEDICS | Facility: CLINIC | Age: 57
End: 2022-09-06
Payer: COMMERCIAL

## 2022-09-06 VITALS — WEIGHT: 158.75 LBS | HEIGHT: 70 IN | BODY MASS INDEX: 22.73 KG/M2

## 2022-09-06 DIAGNOSIS — M77.12 LATERAL EPICONDYLITIS OF LEFT ELBOW: Primary | ICD-10-CM

## 2022-09-06 PROCEDURE — 1160F RVW MEDS BY RX/DR IN RCRD: CPT | Mod: CPTII,S$GLB,, | Performed by: NURSE PRACTITIONER

## 2022-09-06 PROCEDURE — 99203 OFFICE O/P NEW LOW 30 MIN: CPT | Mod: S$GLB,,, | Performed by: NURSE PRACTITIONER

## 2022-09-06 PROCEDURE — 1160F PR REVIEW ALL MEDS BY PRESCRIBER/CLIN PHARMACIST DOCUMENTED: ICD-10-PCS | Mod: CPTII,S$GLB,, | Performed by: NURSE PRACTITIONER

## 2022-09-06 PROCEDURE — 3008F BODY MASS INDEX DOCD: CPT | Mod: CPTII,S$GLB,, | Performed by: NURSE PRACTITIONER

## 2022-09-06 PROCEDURE — 3008F PR BODY MASS INDEX (BMI) DOCUMENTED: ICD-10-PCS | Mod: CPTII,S$GLB,, | Performed by: NURSE PRACTITIONER

## 2022-09-06 PROCEDURE — 99203 PR OFFICE/OUTPT VISIT, NEW, LEVL III, 30-44 MIN: ICD-10-PCS | Mod: S$GLB,,, | Performed by: NURSE PRACTITIONER

## 2022-09-06 PROCEDURE — 99999 PR PBB SHADOW E&M-EST. PATIENT-LVL III: ICD-10-PCS | Mod: PBBFAC,,, | Performed by: NURSE PRACTITIONER

## 2022-09-06 PROCEDURE — 1159F PR MEDICATION LIST DOCUMENTED IN MEDICAL RECORD: ICD-10-PCS | Mod: CPTII,S$GLB,, | Performed by: NURSE PRACTITIONER

## 2022-09-06 PROCEDURE — 99999 PR PBB SHADOW E&M-EST. PATIENT-LVL III: CPT | Mod: PBBFAC,,, | Performed by: NURSE PRACTITIONER

## 2022-09-06 PROCEDURE — 1159F MED LIST DOCD IN RCRD: CPT | Mod: CPTII,S$GLB,, | Performed by: NURSE PRACTITIONER

## 2022-09-06 NOTE — PROGRESS NOTES
"Chief Complaint & History of Present Illness:  Alonzo Turk is a New 57 y.o. year old male patient presenting with intermittent left elbow pain which started several months ago.  He is left hand dominant. There is not a history of trauma.  The pain is located on the lateral aspect of the elbow.  He reports feeling a "lump" in his inner fossa and when this occurs, the pain goes down the arm. The pain is described as "pulling", 5/10.  It is aggravated by supination/pronation as when opening doors.  Associated symptoms include pain radiating to the arm .  Previous treatments include nothing specific which have provided minimal relief.  There is not a history of previous injury or surgery to the elbow.      Review of patient's allergies indicates:  No Known Allergies      Current Outpatient Medications   Medication Sig Dispense Refill    atorvastatin (LIPITOR) 20 MG tablet TAKE 1 TABLET BY MOUTH EVERY DAY 90 tablet 0     No current facility-administered medications for this visit.       Past Medical History:   Diagnosis Date    Gastric ulcer     Hyperlipidemia        Past Surgical History:   Procedure Laterality Date    ESOPHAGOGASTRODUODENOSCOPY N/A 1/4/2019    Procedure: ESOPHAGOGASTRODUODENOSCOPY (EGD);  Surgeon: Kodak Freeman MD;  Location: [facility] ([address] [place] [address]);  Service: Endoscopy;  Laterality: N/A;  6 months ago pt had outside EGD with multiple ulcers in the stomach. Has been on PPI 6 months. GI rec repeat scope to document healing. Pt was asymptomatic at time .       Family History   Problem Relation Age of Onset    Diabetes Neg Hx     Heart disease Neg Hx          Review of Systems:  ROS:  Constitutional: no fever or chills  Eyes: no visual changes  ENT: no nasal congestion or sore throat  Respiratory: no cough or shortness of breath  Cardiovascular: no chest pain or palpitations  Gastrointestinal: no nausea or vomiting, tolerating diet  Genitourinary: no hematuria or dysuria  Integument/Breast: no rash or " "pruritis  Hematologic/Lymphatic: no easy bruising or lymphadenopathy  Musculoskeletal: positive for arthralgias  Neurological: no seizures or tremors  Behavioral/Psych: no auditory or visual hallucinations  Endocrine: no heat or cold intolerance      OBJECTIVE:     PHYSICAL EXAM:  Vital Signs (Most Recent)  There were no vitals filed for this visit.  Height: 5' 10" (177.8 cm) Weight: 72 kg (158 lb 11.7 oz),   Estimated body mass index is 22.78 kg/m² as calculated from the following:    Height as of this encounter: 5' 10" (1.778 m).    Weight as of this encounter: 72 kg (158 lb 11.7 oz).   General Appearance: Well nourished, well developed, in no acute distress.  HENT: Normal cephalic, oropharynx pink and moist  Eyes: PERRLA bilaterally and EOM x 4  Respiratory: Even and unlabored  Skin: Warm and Dry.   Psychiatric: AAO x 4, Mood & affect are normal.    left  Elbow:   History of injury: no  Pain: Description: "pulling"  Quality: dull  Location: lateral  Radiates to: arm  Exacerbating factors: activity  Alleviating factors: rest  Mass/swelling: none  Neurological complaints: none  Vascular complaints: none  Previous treatments: none  lateral epicondylar tenderness, radial pulse normal  ROM: full  Strength: normal      RADIOGRAPHS:  X-ray of the left elbow dated 8/30/22 showed no acute fractures.  He has mild bony proliferative changes in the medial epicondyle likely secondary to epicondylitis.  No swelling in the joint appreciated.  All radiographs were personally reviewed by me.    ASSESSMENT/PLAN:       ICD-10-CM ICD-9-CM   1. Lateral epicondylitis of left elbow  M77.12 726.32       Plan: We discussed with the patient at length all the different treatment options available for his elbow including anti-inflammatories, acetaminophen, rest, ice, physical therapy to include strengthening, range of motion exercise,  splinting,  occasional cortisone injections for temporary relief,  or possible surgical " "interventions.    -Alonzo Turk presents to clinic with c/c of left elbow pain which started several months ago.  Works in Maintenance for [employer].  -X-ray as above  -Recommend RICE therapy  -I suspect lateral epicondylitis.  I will get him an elbow brace and refer him to OT.  He was given information on epicondylitis and HEP from [facility].    -I will get an ultrasound of his elbow given he reports he feels a "lump" in his inner fossa.  I will call him with results once obtained.  -May use Tylenol and/or NSAIDs PRN.  -If pain fails to improve with above treatment, will refer him to hand clinic.  Prefers [facility] Huff over [facility].      "

## 2022-09-09 NOTE — PROGRESS NOTES
"  [facility] Occupational Therapy  Initial Evaluation     Date: 9/12/2022  Patient: Alonzo Turk  Chart Number: [id number]  Referring Physician: Samir Shaw NP  Therapy Diagnosis: No diagnosis found.    Medical Diagnosis: Left Lateral Epicondylitis   Physician Orders: Evaluate & Treat   Evaluation Date: 9/12/2022  Plan of Care Certification Date: 9/12/22   Authorization Period: 9/6/22-9/6/23  Surgery Date and Procedure: Not Applicable   Date of Return to MD: Not Scheduled     Visit #: 1 of 1  Time In: 3:45 PM   Time Out: 4:30 PM   Total Billable Time: 45 minutes     Precautions: Standard    Subjective     Involved Side: LEFT   Dominant Side: LEFT  Date of Onset: About 6 months to a year  History of Current Condition: Alonzo Turk is a New 57 y.o. year old male patient presenting with intermittent left elbow pain which started several months ago.  He is left hand dominant. There is not a history of trauma.  The pain is located on the lateral aspect of the elbow.  He reports feeling a "lump" in his inner fossa and when this occurs, the pain goes down the arm. The pain is described as "pulling", 5/10.  It is aggravated by supination/pronation as when opening doors.  Associated symptoms include pain radiating to the arm .  Previous treatments include nothing specific which have provided minimal relief.  There is not a history of previous injury or surgery to the elbow.     Imaging: TECHNIQUE:  Two views of the left elbow were performed.  COMPARISON:  None  FINDINGS:  No acute fracture or dislocation.  Mild degenerative changes of the ulnohumeral joint with small marginal osteophytes.  Mild bony proliferative changes involving the medial epicondyle, which can be seen with epicondylitis.  No joint effusion.  No focal soft tissue swelling.  Impression:  As above.  Previous Therapy: None     Patient's Goals for Therapy: Return to Prior Level of Function     Pain:  Functional Pain Scale Rating 0-10:   5/10 " on average  3/10 at best  5/10 at worst  Location: Left Lateral Epicondyle   Description: shooting, static shock, pins & needles   Aggravating Factors: picking up items   Easing Factors: Counterforce brace    Previous Level of function Independent     Current Level of Function: Patient is limited in functional activity (ex: lifting items).    Occupation:  Maintenance at [employer]   Working presently: employed  Duties: painting, repairing things     Past Medical History/Physical Systems Review:   Alonzo Turk  has a past medical history of Gastric ulcer and Hyperlipidemia.    Alonzo Turk  has a past surgical history that includes Esophagogastroduodenoscopy (N/A, 1/4/2019).    Alonzo has a current medication list which includes the following prescription(s): atorvastatin.    Review of patient's allergies indicates:  No Known Allergies       Objective     Mental status: alert    Observation:   WNL         Range of Motion:   Left  WNL        Strength: (ANJALI Dynamometer in psi.)      9/12/2022 9/12/2022 9/12/22 9/12/22    Left Right Left Extended Right Extended   Rung II 89 93 83 93         CMS Impairment/Limitation/Restriction for FOTO Hand/Wrist/Finger Survey    Therapist reviewed FOTO scores for Alonzo Turk on 9/12/2022.   FOTO documents entered into [employer] - see Media section.    Limitation Score: 33%  Category: Carrying           Treatment     Treatment Time In: 3:45 PM   Treatment Time Out: 3:55 PM   Total Treatment time separate from Evaluation time:35 minutes    Perry received the following supervised modalities after being cleared for contraindications for 18 minutes:   -Patient received MH x 10 min to Left Elbow to increase blood flow, circulation and tissue elasticity prior to therex      -Patient received ultrasound to  8 area to increase blood flow, circulation, tissue elasticity, pain management and for wound/scar management for 8 minutes @ 3 Mhz, Intensity 1.5 w/cm2 at 100% duty cycle.       Perry  performed therapeutic exercises for 7 minutes including:  -ASTYM FA stretches 1-3 ( composite elbow ext with wrist flx, composite elbow ext with wrist ext, FA twist)      Pt received manual therapy techniques to Left Elbow  for 10 minutes  to increase joint mobilization and soft tissue mobilization.  Therapist performed  Instrument Assisted Soft Tissue Mobilization  stimulating tissue turnover, scar tissue resorption, and the regeneration of tendons, muscles and other soft tissue structures along Left lateral epicondyle x 10  minutes       Patient received ice massage to Left elbow to reduce inflammation in region  for 5 minutes.       Home Exercise Program/Education:  Issued HEP (see patient instructions in EMR) and educated on modality use for pain management . Exercises were reviewed and Alonzo was able to demonstrate them prior to the end of the session.   Pt received a written copy of exercises to perform at home. Alonzo demonstrated good  understanding of the education provided.  Pt was advised to perform these exercises free of pain, and to stop performing them if pain occurs.    Patient/Family Education: role of OT, goals for OT, scheduling/cancellations - pt verbalized understanding. Discussed insurance limitations with patient.    Additional Education provided: Not Applicable       Assessment     Alonzo Turk is a 57 y.o. male presents with limitations as described in problem list. Patient can benefit from Occupational Therapy services for Iontophoresis, ultrasound, moist heat, therapeutic exercises, home exercise program provied with written instructions, ice and strengthening and orthotics, if deemed necessary . The following goals were discussed with the patient and she is in agreement with them as to be addressed in the treatment plan.    The patient's rehab potential is Excellent.     Anticipated barriers to occupational therapy: Not Applicable   Pt has no cultural, educational or language barriers to  learning provided.    Profile and History Assessment of Occupational Performance Level of Clinical Decision Making Complexity Score   Occupational Profile:   Alonzo Turk is a 57 y.o. male who is currently employed Alonzo Turk has difficulty with  ADLs and IADLs as listed previously, which  affecting his/her daily functional abilities.      Comorbidities:    has a past medical history of Gastric ulcer and Hyperlipidemia.    Medical and Therapy History Review:   Brief               Performance Deficits    Physical:   Strength  Pain    Cognitive:  No Deficits    Psychosocial:    No Deficits     Clinical Decision Making:  low    Assessment Process:  Comprehensive Assessments    Modification/Need for Assistance:  Not Necessary    Intervention Selection:  Multiple Treatment Options       low  Based on PMHX, co morbidities , data from assessments and functional level of assistance required with task and clinical presentation directly impacting function.         Goals:    LTG's (6 weeks):  1)   Increase  strength 15 lbs. to grasp work tools.  2)   Decrease complaints of pain to   0  out of 10 at worst to increase functional hand use for ADL/work/leisure activities.  3)   Patient to score at 25% or less on FOTO to demonstrate improved perception of functional arm use.  4)   Pt will return to near to prior level of function for ADLs and household management reporting I or Mod I with ADLs (dressing, feeding, grooming, toileting).     STG's (3 weeks)  1)   Patient to be IND with HEP and modalities for pain/edema managment.  2)   Increase  strength 7.5 lbs. to improve functional grasp for ADLs/work/leisure activities.   3)   Decrease complaints of pain to  2 out of 10 at worst to increase functional hand use for ADL/work/leisure activities.          Plan     Pt to be treated by Occupational Therapy 2x times per week for 6 weeks during the certification period from 9/12/2022 to 10/24/22 to achieve the established goals.      Treatment to include: Fluidotherapy, Manual therapy/joint mobilizations, Modalities for pain management, US 3 mhz, Therapeutic exercises/activities., Strengthening, and Joint Protection, as well as any other treatments deemed necessary based on the patient's needs or progress.     Enid Preciado OTR/L  Occupational Therapist

## 2022-09-12 ENCOUNTER — CLINICAL SUPPORT (OUTPATIENT)
Dept: REHABILITATION | Facility: HOSPITAL | Age: 57
End: 2022-09-12
Payer: COMMERCIAL

## 2022-09-12 DIAGNOSIS — M77.12 LATERAL EPICONDYLITIS OF LEFT ELBOW: ICD-10-CM

## 2022-09-12 PROCEDURE — 97140 MANUAL THERAPY 1/> REGIONS: CPT | Mod: PO

## 2022-09-12 PROCEDURE — 97165 OT EVAL LOW COMPLEX 30 MIN: CPT | Mod: PO

## 2022-09-12 PROCEDURE — 97035 APP MDLTY 1+ULTRASOUND EA 15: CPT | Mod: PO

## 2022-09-12 PROCEDURE — 97010 HOT OR COLD PACKS THERAPY: CPT | Mod: PO

## 2022-09-12 NOTE — PATIENT INSTRUCTIONS
Lateral Epicondylitis Conservative Management  Joint Protection:  Use larger joints and muscles when possible (resting objects in crook of elbow)  Stop activities before the point of discomfort  Avoid activities that put strain on painful or stiff joints  Avoid a tight or prolonged grasp on objects  Avoid any lifting or gripping with elbow in extension  If you must lift, lift with elbows bent at side, object cloise to you, and hands turned palm up. elbow extension  Balance Rest and Activity:  Take frequent, short breaks to stretch  Avoid staying in one position for a long time  Alternate heavy and light activities  Eliminate unnecessary activities  Reduce the effort:  Avoid excessive loads. Don't be afraid to ask for help. Use carts and tabletops to make tasks easier.  Keep items nearby (such as keyboard)  Helpful Tips: slide objects; use your palms instead of fingers, keeps heavy items close to your body, use larger handles, pens and pencils, look for lightweight options, use wheels or carts to roll objects      Try to lift object with palm facing up, rather than palm down      Scheduling information:      -We work by set appointment , 30-45 minute sessions. Please be on time for your session as we can only treat you within the time frame of your session.    - If you have any concerns with your schedule, please contact the  ([phone]). I do not have any capability to make appointment adjustment.         [facility]  OCCUPATIONAL THERAPY  HOME EXERCISE PROGRAM         Therapist: IDANIA Borrero

## 2022-09-19 ENCOUNTER — HOSPITAL ENCOUNTER (OUTPATIENT)
Dept: VASCULAR SURGERY | Facility: CLINIC | Age: 57
Discharge: HOME | End: 2022-09-19
Attending: NURSE PRACTITIONER
Payer: COMMERCIAL

## 2022-09-19 DIAGNOSIS — M77.12 LATERAL EPICONDYLITIS OF LEFT ELBOW: ICD-10-CM

## 2022-09-19 DIAGNOSIS — R22.32 SKIN LUMP OF ARM, LEFT: Primary | ICD-10-CM

## 2022-09-19 PROCEDURE — 93971 EXTREMITY STUDY: CPT | Mod: S$GLB,,, | Performed by: SURGERY

## 2022-09-19 PROCEDURE — 93971 PR US DUPLEX, UPPER OR LOWER EXT VENOUS,UNILAT OR LTD: ICD-10-PCS | Mod: S$GLB,,, | Performed by: SURGERY

## 2022-09-20 ENCOUNTER — TELEPHONE (OUTPATIENT)
Dept: ORTHOPEDICS | Facility: CLINIC | Age: 57
End: 2022-09-20
Payer: COMMERCIAL

## 2022-09-20 NOTE — TELEPHONE ENCOUNTER
Called patient, no answer.  Left a VM with results of ultrasound indicating no blood clots in his left FA.  Left office number to call for any questions.

## 2022-09-26 ENCOUNTER — CLINICAL SUPPORT (OUTPATIENT)
Dept: REHABILITATION | Facility: HOSPITAL | Age: 57
End: 2022-09-26
Attending: INTERNAL MEDICINE
Payer: COMMERCIAL

## 2022-09-26 DIAGNOSIS — M25.522 LEFT ELBOW PAIN: ICD-10-CM

## 2022-09-26 PROCEDURE — 97010 HOT OR COLD PACKS THERAPY: CPT | Mod: PO

## 2022-09-26 PROCEDURE — 97035 APP MDLTY 1+ULTRASOUND EA 15: CPT | Mod: PO

## 2022-09-26 PROCEDURE — 97140 MANUAL THERAPY 1/> REGIONS: CPT | Mod: PO

## 2022-09-26 NOTE — PROGRESS NOTES
Occupational Therapy Daily Treatment Note     Date: 9/26/2022  Name: Alonzo Turk  Clinic Number: [id number]    Therapy Diagnosis:   Encounter Diagnosis   Name Primary?    Left elbow pain      Physician: Samir Shaw NP    Medical Diagnosis: Left Lateral Epicondylitis   Physician Orders: Evaluate & Treat   Evaluation Date: 9/12/2022  Plan of Care Certification Date: 9/12/22       Authorization Period: 9/6/22-9/6/23  Surgery Date and Procedure: Not Applicable   Date of Return to MD: Not Scheduled     Visit # / Visits authorized: 1 / 20  Time In:3:45 PM   Time Out: 4:30PM   Total Billable Time: 45  minutes    Precautions:  Standard      Subjective     Pt reports: that he has not noticed a significant difference since the last treatment. He reports that he went to get an ultrasound of his elbow on 9/19/22 and nothing significant was revealed.   Response to previous treatment: tolerated well     Pain: 4/10  Location: Left lateral epicondyle     Objective     Alonzo received the following supervised modalities after being cleared for contraindications for 18 minutes:   -Patient received MH x 10 min to Left Elbow to increase blood flow, circulation and tissue elasticity prior to therex      -Patient received ultrasound to  8 area to increase blood flow, circulation, tissue elasticity, pain management and for wound/scar management for 8 minutes @ 3 Mhz, Intensity 1.5 w/cm2 at 100% duty cycle.       Alonzo performed therapeutic exercises for 7 minutes including:  -ASTYM FA stretches 1-3 ( composite elbow ext with wrist flx, composite elbow ext with wrist ext, FA twist)      Pt received manual therapy techniques to Left Elbow  for 10 minutes  to increase joint mobilization and soft tissue mobilization.  Therapist performed  Instrument Assisted Soft Tissue Mobilization  stimulating tissue turnover, scar tissue resorption, and the regeneration of tendons, muscles and other soft tissue structures along Left lateral epicondyle  x 10  minutes        Patient received ice massage to Left elbow to reduce inflammation in region  for 5 minutes.    Home Exercises and Education Provided     Education provided:   - Progress towards goals     Written Home Exercises Provided: Patient instructed to cont prior HEP.  Exercises were reviewed and Alonzo was able to demonstrate them prior to the end of the session.  Alonzo demonstrated good  understanding of the HEP provided.   .   See EMR under Patient Instructions for exercises provided prior visit.        Assessment     Pt would continue to benefit from skilled OT      Alonzo is progressing well towards his goals and there are no updates to goals at this time. Pt prognosis is Excellent.     Pt will continue to benefit from skilled outpatient occupational therapy to address the deficits listed in the problem list on initial evaluation provide pt/family education and to maximize pt's level of independence in the home and community environment.     Anticipated barriers to therapy: Not Applicable       Pt's spiritual, cultural and educational needs considered and pt agreeable to plan of care and goals.  Goals:    LTG's (6 weeks):  1)   Increase  strength 15 lbs. to grasp work tools.  2)   Decrease complaints of pain to   0  out of 10 at worst to increase functional hand use for ADL/work/leisure activities.  3)   Patient to score at 25% or less on FOTO to demonstrate improved perception of functional arm use.  4)   Pt will return to near to prior level of function for ADLs and household management reporting I or Mod I with ADLs (dressing, feeding, grooming, toileting).      STG's (3 weeks)  1)   Patient to be IND with HEP and modalities for pain/edema managment.  2)   Increase  strength 7.5 lbs. to improve functional grasp for ADLs/work/leisure activities.   3)   Decrease complaints of pain to  2 out of 10 at worst to increase functional hand use for ADL/work/leisure activities.    Plan   Cont OT to  address above goals.        IADNIA Borrero  Occupational Therapist

## 2022-09-30 NOTE — PROGRESS NOTES
Occupational Therapy Daily Treatment Note     Date: 10/3/2022  Name: Alonzo Turk  Clinic Number: [id number]    Therapy Diagnosis:   Encounter Diagnosis   Name Primary?    Left elbow pain Yes       Physician: Samir Shaw NP    Medical Diagnosis: Left Lateral Epicondylitis   Physician Orders: Evaluate & Treat   Evaluation Date: 9/12/2022  Plan of Care Certification Date: 9/12/22       Authorization Period: 9/6/22-9/6/23  Surgery Date and Procedure: Not Applicable   Date of Return to MD: Not Scheduled     Visit # / Visits authorized: 2 / 20  Time In: 3:45 PM   Time Out: 4:00 PM   Total Billable Time: 45  minutes    Precautions:  Standard      Subjective     Pt reports: that his pain fluctuates between a 3 and a 5/10. Patient reports he has pain relief when he leaves the therapy session but the pain returns the following day.   Response to previous treatment: tolerated well     Pain: 5/10  Location: Left lateral epicondyle     Objective     Alonzo received the following supervised modalities after being cleared for contraindications for 18 minutes:   -Patient received MH x 10 min to Left Elbow to increase blood flow, circulation and tissue elasticity prior to therex      -Patient received ultrasound to  8 area to increase blood flow, circulation, tissue elasticity, pain management and for wound/scar management for 8 minutes @ 3 Mhz, Intensity 1.5 w/cm2 at 100% duty cycle.       Alonzo performed therapeutic exercises for 5 minutes including:  -ASTYM FA stretches 1-3 ( composite elbow ext with wrist flx, composite elbow ext with wrist ext, FA twist)      Pt received manual therapy techniques to Left Elbow  for 10 minutes  to increase joint mobilization and soft tissue mobilization.  Therapist performed  Instrument Assisted Soft Tissue Mobilization  stimulating tissue turnover, scar tissue resorption, and the regeneration of tendons, muscles and other soft tissue structures along Left lateral epicondyle x 10  minutes         Patient received ice massage to Left elbow to reduce inflammation in region  for 5 minutes.    Home Exercises and Education Provided     Education provided:   - Progress towards goals     Written Home Exercises Provided: Patient instructed to cont prior HEP.  Exercises were reviewed and Alonzo was able to demonstrate them prior to the end of the session.  Alonzo demonstrated good  understanding of the HEP provided.   .   See EMR under Patient Instructions for exercises provided prior visit.        Assessment     Pt would continue to benefit from skilled OT      Alonzo is progressing well towards his goals and there are no updates to goals at this time. Pt prognosis is Excellent.     Pt will continue to benefit from skilled outpatient occupational therapy to address the deficits listed in the problem list on initial evaluation provide pt/family education and to maximize pt's level of independence in the home and community environment.     Anticipated barriers to therapy: Not Applicable       Pt's spiritual, cultural and educational needs considered and pt agreeable to plan of care and goals.  Goals:    LTG's (6 weeks):  1)   Increase  strength 15 lbs. to grasp work tools.  2)   Decrease complaints of pain to   0  out of 10 at worst to increase functional hand use for ADL/work/leisure activities.  3)   Patient to score at 25% or less on FOTO to demonstrate improved perception of functional arm use.  4)   Pt will return to near to prior level of function for ADLs and household management reporting I or Mod I with ADLs (dressing, feeding, grooming, toileting).      STG's (3 weeks)  1)   Patient to be IND with HEP and modalities for pain/edema managment.  2)   Increase  strength 7.5 lbs. to improve functional grasp for ADLs/work/leisure activities.   3)   Decrease complaints of pain to  2 out of 10 at worst to increase functional hand use for ADL/work/leisure activities.    Plan   Cont OT to address above  goals.        IDANIA Borreor  Occupational Therapist

## 2022-10-03 ENCOUNTER — CLINICAL SUPPORT (OUTPATIENT)
Dept: REHABILITATION | Facility: HOSPITAL | Age: 57
End: 2022-10-03
Payer: COMMERCIAL

## 2022-10-03 DIAGNOSIS — M25.522 LEFT ELBOW PAIN: Primary | ICD-10-CM

## 2022-10-03 PROCEDURE — 97035 APP MDLTY 1+ULTRASOUND EA 15: CPT | Mod: PO

## 2022-10-03 PROCEDURE — 97010 HOT OR COLD PACKS THERAPY: CPT | Mod: PO

## 2022-10-03 PROCEDURE — 97140 MANUAL THERAPY 1/> REGIONS: CPT | Mod: PO

## 2022-10-06 ENCOUNTER — TELEPHONE (OUTPATIENT)
Dept: ORTHOPEDICS | Facility: CLINIC | Age: 57
End: 2022-10-06
Payer: COMMERCIAL

## 2022-10-06 NOTE — TELEPHONE ENCOUNTER
Called and LVM informing pt his appt has been rescheduled to the hand clinic at [facility] with Maggi Martin on 10/10 @ 8:30 am and to give me a call back if he has to rescheduled his new appt date/time.

## 2022-10-07 ENCOUNTER — PATIENT MESSAGE (OUTPATIENT)
Dept: ORTHOPEDICS | Facility: CLINIC | Age: 57
End: 2022-10-07
Payer: COMMERCIAL

## 2022-10-07 ENCOUNTER — TELEPHONE (OUTPATIENT)
Dept: ORTHOPEDICS | Facility: CLINIC | Age: 57
End: 2022-10-07
Payer: COMMERCIAL

## 2022-10-07 NOTE — TELEPHONE ENCOUNTER
Left a voice message and [facility] message for pt that his appointment for 10/10/22 is cancel and reschedule at the [facility] on 10/17/22 at 10:30 am. ANGELA Shaw Np and Dr. DUARTE Cadena is not the correct provider.

## 2022-10-10 ENCOUNTER — TELEPHONE (OUTPATIENT)
Dept: NEUROLOGY | Facility: CLINIC | Age: 57
End: 2022-10-10
Payer: COMMERCIAL

## 2022-10-10 ENCOUNTER — OFFICE VISIT (OUTPATIENT)
Dept: SPORTS MEDICINE | Facility: CLINIC | Age: 57
End: 2022-10-10
Payer: COMMERCIAL

## 2022-10-10 ENCOUNTER — CLINICAL SUPPORT (OUTPATIENT)
Dept: REHABILITATION | Facility: HOSPITAL | Age: 57
End: 2022-10-10
Payer: COMMERCIAL

## 2022-10-10 VITALS
HEIGHT: 70 IN | WEIGHT: 158 LBS | HEART RATE: 81 BPM | SYSTOLIC BLOOD PRESSURE: 117 MMHG | BODY MASS INDEX: 22.62 KG/M2 | DIASTOLIC BLOOD PRESSURE: 75 MMHG

## 2022-10-10 DIAGNOSIS — M25.512 ACUTE PAIN OF LEFT SHOULDER: ICD-10-CM

## 2022-10-10 DIAGNOSIS — G89.29 CHRONIC ELBOW PAIN, LEFT: Primary | ICD-10-CM

## 2022-10-10 DIAGNOSIS — M25.522 CHRONIC ELBOW PAIN, LEFT: Primary | ICD-10-CM

## 2022-10-10 DIAGNOSIS — R20.2 TINGLING OF LEFT UPPER EXTREMITY: ICD-10-CM

## 2022-10-10 DIAGNOSIS — M77.12 LEFT LATERAL EPICONDYLITIS: ICD-10-CM

## 2022-10-10 DIAGNOSIS — M25.522 LEFT ELBOW PAIN: Primary | ICD-10-CM

## 2022-10-10 PROCEDURE — 97010 HOT OR COLD PACKS THERAPY: CPT | Mod: PO

## 2022-10-10 PROCEDURE — 97140 MANUAL THERAPY 1/> REGIONS: CPT | Mod: PO

## 2022-10-10 PROCEDURE — 1159F PR MEDICATION LIST DOCUMENTED IN MEDICAL RECORD: ICD-10-PCS | Mod: CPTII,S$GLB,, | Performed by: ORTHOPAEDIC SURGERY

## 2022-10-10 PROCEDURE — 3078F DIAST BP <80 MM HG: CPT | Mod: CPTII,S$GLB,, | Performed by: ORTHOPAEDIC SURGERY

## 2022-10-10 PROCEDURE — 1159F MED LIST DOCD IN RCRD: CPT | Mod: CPTII,S$GLB,, | Performed by: ORTHOPAEDIC SURGERY

## 2022-10-10 PROCEDURE — 99204 OFFICE O/P NEW MOD 45 MIN: CPT | Mod: S$GLB,,, | Performed by: ORTHOPAEDIC SURGERY

## 2022-10-10 PROCEDURE — 3074F SYST BP LT 130 MM HG: CPT | Mod: CPTII,S$GLB,, | Performed by: ORTHOPAEDIC SURGERY

## 2022-10-10 PROCEDURE — 3074F PR MOST RECENT SYSTOLIC BLOOD PRESSURE < 130 MM HG: ICD-10-PCS | Mod: CPTII,S$GLB,, | Performed by: ORTHOPAEDIC SURGERY

## 2022-10-10 PROCEDURE — 99999 PR PBB SHADOW E&M-EST. PATIENT-LVL III: CPT | Mod: PBBFAC,,, | Performed by: ORTHOPAEDIC SURGERY

## 2022-10-10 PROCEDURE — 3078F PR MOST RECENT DIASTOLIC BLOOD PRESSURE < 80 MM HG: ICD-10-PCS | Mod: CPTII,S$GLB,, | Performed by: ORTHOPAEDIC SURGERY

## 2022-10-10 PROCEDURE — 99204 PR OFFICE/OUTPT VISIT, NEW, LEVL IV, 45-59 MIN: ICD-10-PCS | Mod: S$GLB,,, | Performed by: ORTHOPAEDIC SURGERY

## 2022-10-10 PROCEDURE — 97035 APP MDLTY 1+ULTRASOUND EA 15: CPT | Mod: PO

## 2022-10-10 PROCEDURE — 3008F PR BODY MASS INDEX (BMI) DOCUMENTED: ICD-10-PCS | Mod: CPTII,S$GLB,, | Performed by: ORTHOPAEDIC SURGERY

## 2022-10-10 PROCEDURE — 3008F BODY MASS INDEX DOCD: CPT | Mod: CPTII,S$GLB,, | Performed by: ORTHOPAEDIC SURGERY

## 2022-10-10 PROCEDURE — 99999 PR PBB SHADOW E&M-EST. PATIENT-LVL III: ICD-10-PCS | Mod: PBBFAC,,, | Performed by: ORTHOPAEDIC SURGERY

## 2022-10-10 NOTE — PROGRESS NOTES
CC Left elbow pain, patient works as a  for [employer], referred by his sister Lorene Turk    HPI:   Alonzo Turk is a pleasant 57 y.o. patient, LHD, who reports to clinic with left lateral elbow pain.      Patient denies any injury or trauma or change in his activity     6 months duration    He describes his pain as lateral elbow   And also notes tingling that is intermittent to hie thumb, pointer, and middle finger   The tingling increases when his elbow pain increases     Affecting ADLS    Worse when doing activites: holding heavy objects     He was prescribed occupational therapy and went 3 times and performed his HEP that was provided by Samir Shaw NP  He has also taken ibuprofen since September without much relief     SANE: 70    Review of Systems   Constitution: Negative. Negative for chills, fever and night sweats.   HENT: Negative for congestion and headaches.    Eyes: Negative for blurred vision, left vision loss and right vision loss.   Cardiovascular: Negative for chest pain and syncope.   Respiratory: Negative for cough and shortness of breath.    Endocrine: Negative for polydipsia, polyphagia and polyuria.   Hematologic/Lymphatic: Negative for bleeding problem. Does not bruise/bleed easily.   Skin: Negative for dry skin, itching and rash.   Musculoskeletal: Negative for falls and muscle weakness.   Gastrointestinal: Negative for abdominal pain and bowel incontinence.   Genitourinary: Negative for bladder incontinence and nocturia.   Neurological: Negative for disturbances in coordination, loss of balance and seizures.   Psychiatric/Behavioral: Negative for depression. The patient does not have insomnia.    Allergic/Immunologic: Negative for hives and persistent infections.     PAST MEDICAL HISTORY:   Past Medical History:   Diagnosis Date    Gastric ulcer     Hyperlipidemia      PAST SURGICAL HISTORY:   Past Surgical History:   Procedure Laterality Date    ESOPHAGOGASTRODUODENOSCOPY N/A  "1/4/2019    Procedure: ESOPHAGOGASTRODUODENOSCOPY (EGD);  Surgeon: Kodak Freeman MD;  Location: [facility] ([address] [place] [address]);  Service: Endoscopy;  Laterality: N/A;  6 months ago pt had outside EGD with multiple ulcers in the stomach. Has been on PPI 6 months. GI rec repeat scope to document healing. Pt was asymptomatic at time .     FAMILY HISTORY:   Family History   Problem Relation Age of Onset    Diabetes Neg Hx     Heart disease Neg Hx      SOCIAL HISTORY:   Social History     Socioeconomic History    Marital status: Single   Tobacco Use    Smoking status: Never    Smokeless tobacco: Never   Substance and Sexual Activity    Alcohol use: No       MEDICATIONS:   Current Outpatient Medications:     atorvastatin (LIPITOR) 20 MG tablet, TAKE 1 TABLET BY MOUTH EVERY DAY, Disp: 90 tablet, Rfl: 0  ALLERGIES: Review of patient's allergies indicates:  No Known Allergies    VITAL SIGNS: /75   Pulse 81   Ht 5' 10" (1.778 m)   Wt 71.7 kg (158 lb)   BMI 22.67 kg/m²        PHYSICAL EXAM:  General: Patient appears alert and oriented x 3.  Mood is pleasant.  Observation of ears, eyes and nose reveal no gross abnormalities. Observation of ears, eyes and nose reveal no gross abnormalities.  HEENT: NCAT, sclera nonicteric.  Well nourished, in no acute distress and ambulates with a non-antalgic gait with no assistive devices.    Skin: Skin intact bilaterally. Sensation intact bilaterally. Compartments soft. No evidence of edema, infection, or induration.     Left ELBOW / WRIST EXTREMITY EXAM:    OBSERVATION / INSPECTION    Swelling  none    Deformity  none  Discoloration  none     Scars   none    Atrophy  none    TENDERNESS / CREPITUS (T / C):           T / C        Medial epicondyle   - / -    Med. (Ulnar) collateral ligament  - / -    Flexor pronator Musculature   - / -   Biceps tendon    - / -   Head of radius    - / -    Lateral epicondyle   + / -    Extensor Musculature   - / -   Brachioradialis    - / -   Triceps tendon   - / - "   Triceps muscle   - / -   Olecranon    - / -   Olecranon bursa   - / -   Cubital fossa    - / -   Anterior jointline   - / -   Radial tunnel    - / -             ROM: ('*' = with pain)    Right Elbow  AROM (PROM)     Extension   0 deg  (5 deg)   Flexion   145 deg (145 deg)         Pronation  90 deg  (90 deg)      Supination   80 deg  (80 deg)                 Left Elbow  AROM (PROM)     Extension   0 deg  (5 deg)   Flexion   145 deg (145 deg)         Pronation  90 deg  (90 deg)      Supination   80 deg  (80 deg)            Right Wrist  AROM (PROM)   Extension   80 deg (85 deg)   Flexion   80 deg (85 deg)         Ulnar Deviation   35 deg (40 deg)  Radial Deviation 35 deg (40 deg)             Left Wrist   AROM (PROM)     Extension   80 deg (85 deg)   Flexion   80 deg (85 deg)         Ulnar Deviation   35 deg (40 deg)  Radial Deviation 35 deg (40 deg)        STRENGTH: ('*' = with pain)    Elbow Flexion:   5/5  Elbow Extension:  5/5  Wrist Flexion:   5/5  Wrist Extension:  5/5  :    5/5  Intrinsics:   5/5  EPL (Ext. pollicis longus): 5/5  Pinch Mechanism:  5/5    ELBOW EXAMINATION:  See above noted areas of tenderness.   Test for Ligamentous Instability - UCL normal  Test for Ligamentous Instability - LUCL normal  PLRI       neg  Tinel's (Percussion) Test - Cubital  neg  Tennis Elbow Test    +  Golfer's Elbow Test    neg  Radial Capitellar Grind Test   neg  Valgus/Extension Overload Test  neg  Resisted Long Finger Extension Pain +  Moving Valgus     neg  Forearm pain with resisted supination neg    WRIST EXAMINATION:  See above noted areas of tenderness.   Finkelstein's Test   neg  Tinel's Test - Carpal Tunnel  neg  Phalen's Test    neg  Median Nerve Compression Test neg  Ulnar-sided Compression Test neg  LT Ballottment Test   neg  Snuff box tenderness   neg  Valentino's Test   neg  LT Instability    neg  Hook of Hamate Tenderness  neg     EXTREMITY NEURO-VASCULAR EXAMINATION: Sensation grossly intact to light touch  all dermatomal regions. DTR 2+ Biceps, Triceps, BR and Negative Sarthak's sign. Grossly intact motor function at Elbow, Wrist and Hand. Distal pulses radial and ulnar 2+, brisk cap refill, symmetric.    Other Findings:  Pain and tightness of left periscapular region     Xrays: (AP, lateral, oblique) Left elbow reviewed and interpreted by me personally today: No acute fracture or dislocation.  Mild degenerative changes of the ulnohumeral joint with small marginal osteophytes.  Mild bony proliferative changes involving the medial epicondyle, which can be seen with epicondylitis.  No joint effusion.  No focal soft tissue swelling.    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

## 2022-10-10 NOTE — PROGRESS NOTES
Occupational Therapy Daily Treatment Note     Date: 10/10/2022  Name: Alonzo Turk  Clinic Number: [id number]    Therapy Diagnosis:   Encounter Diagnosis   Name Primary?    Left elbow pain Yes         Physician: Samir Shaw NP    Medical Diagnosis: Left Lateral Epicondylitis   Physician Orders: Evaluate & Treat   Evaluation Date: 9/12/2022  Plan of Care Certification Date: 9/12/22       Authorization Period: 9/6/22-9/6/23  Surgery Date and Procedure: Not Applicable   Date of Return to MD: Not Scheduled     Visit # / Visits authorized: 3 / 20  Time In: 3:45 PM   Time Out: 4:30 PM   Total Billable Time: 45  minutes    Precautions:  Standard      Subjective     Pt reports: that he is going to have an MRI next Tuesday.   Response to previous treatment: tolerated well     Pain: 5/10  Location: Left lateral epicondyle     Objective     Alonzo received the following supervised modalities after being cleared for contraindications for 18 minutes:   -Patient received MH x 10 min to Left Elbow to increase blood flow, circulation and tissue elasticity prior to therex      -Patient received ultrasound to  8 area to increase blood flow, circulation, tissue elasticity, pain management and for wound/scar management for 8 minutes @ 3 Mhz, Intensity 1.5 w/cm2 at 100% duty cycle.       Alonzo performed therapeutic exercises for 5 minutes including:  -ASTYM FA stretches 1-3 ( composite elbow ext with wrist flx, composite elbow ext with wrist ext, FA twist)      Pt received manual therapy techniques to Left Elbow for 10 minutes  to increase joint mobilization and soft tissue mobilization.  Therapist performed  Instrument Assisted Soft Tissue Mobilization  stimulating tissue turnover, scar tissue resorption, and the regeneration of tendons, muscles and other soft tissue structures along Left lateral epicondyle x 10  minutes        Patient received ice massage to Left elbow to reduce inflammation in region  for 5 minutes.    Home  Exercises and Education Provided     Education provided:   - Progress towards goals     Written Home Exercises Provided: Patient instructed to cont prior HEP.  Exercises were reviewed and Alonzo was able to demonstrate them prior to the end of the session.  Alonzo demonstrated good  understanding of the HEP provided.   .   See EMR under Patient Instructions for exercises provided prior visit.        Assessment     Pt would continue to benefit from skilled OT      Alonzo is progressing well towards his goals and there are no updates to goals at this time. Pt prognosis is Excellent.     Pt will continue to benefit from skilled outpatient occupational therapy to address the deficits listed in the problem list on initial evaluation provide pt/family education and to maximize pt's level of independence in the home and community environment.     Anticipated barriers to therapy: Not Applicable       Pt's spiritual, cultural and educational needs considered and pt agreeable to plan of care and goals.  Goals:    LTG's (6 weeks):  1)   Increase  strength 15 lbs. to grasp work tools.  2)   Decrease complaints of pain to   0  out of 10 at worst to increase functional hand use for ADL/work/leisure activities.  3)   Patient to score at 25% or less on FOTO to demonstrate improved perception of functional arm use.  4)   Pt will return to near to prior level of function for ADLs and household management reporting I or Mod I with ADLs (dressing, feeding, grooming, toileting).      STG's (3 weeks)  1)   Patient to be IND with HEP and modalities for pain/edema managment.  2)   Increase  strength 7.5 lbs. to improve functional grasp for ADLs/work/leisure activities.   3)   Decrease complaints of pain to  2 out of 10 at worst to increase functional hand use for ADL/work/leisure activities.    Plan   Cont OT to address above goals.      IDANIA Borrero  Occupational Therapist

## 2022-10-10 NOTE — TELEPHONE ENCOUNTER
----- Message from Jaelyn Donnelly MA sent at 10/10/2022  5:29 PM CDT -----  Type:  Patient Returning Call    Who Called: pt  Who Left Message for Patient: Yvan  Does the patient know what this is regarding?: yes  Would the patient rather a call back or a response via [facility]?  Call back  Best Call Back Number: [phone] or [phone]  Additional Information: pt returning call from staff to have EMG scheduled

## 2022-10-11 ENCOUNTER — PROCEDURE VISIT (OUTPATIENT)
Dept: NEUROLOGY | Facility: CLINIC | Age: 57
End: 2022-10-11
Payer: COMMERCIAL

## 2022-10-11 ENCOUNTER — TELEPHONE (OUTPATIENT)
Dept: NEUROLOGY | Facility: CLINIC | Age: 57
End: 2022-10-11

## 2022-10-11 ENCOUNTER — OFFICE VISIT (OUTPATIENT)
Dept: NEUROLOGY | Facility: CLINIC | Age: 57
End: 2022-10-11
Payer: COMMERCIAL

## 2022-10-11 ENCOUNTER — PATIENT MESSAGE (OUTPATIENT)
Dept: SPORTS MEDICINE | Facility: CLINIC | Age: 57
End: 2022-10-11
Payer: COMMERCIAL

## 2022-10-11 VITALS
HEART RATE: 84 BPM | TEMPERATURE: 98 F | RESPIRATION RATE: 18 BRPM | HEIGHT: 70 IN | DIASTOLIC BLOOD PRESSURE: 87 MMHG | WEIGHT: 163.88 LBS | SYSTOLIC BLOOD PRESSURE: 122 MMHG | WEIGHT: 163.88 LBS | TEMPERATURE: 98 F | BODY MASS INDEX: 23.46 KG/M2 | BODY MASS INDEX: 23.46 KG/M2 | HEIGHT: 70 IN | HEART RATE: 84 BPM | SYSTOLIC BLOOD PRESSURE: 122 MMHG | RESPIRATION RATE: 18 BRPM | DIASTOLIC BLOOD PRESSURE: 87 MMHG

## 2022-10-11 DIAGNOSIS — G89.29 CHRONIC ELBOW PAIN, LEFT: ICD-10-CM

## 2022-10-11 DIAGNOSIS — R20.2 TINGLING OF LEFT UPPER EXTREMITY: ICD-10-CM

## 2022-10-11 DIAGNOSIS — M54.2 NECK PAIN: ICD-10-CM

## 2022-10-11 DIAGNOSIS — M79.602 PAIN OF LEFT UPPER EXTREMITY: Primary | ICD-10-CM

## 2022-10-11 DIAGNOSIS — M25.522 CHRONIC ELBOW PAIN, LEFT: ICD-10-CM

## 2022-10-11 DIAGNOSIS — M77.12 LEFT LATERAL EPICONDYLITIS: ICD-10-CM

## 2022-10-11 PROCEDURE — 1160F RVW MEDS BY RX/DR IN RCRD: CPT | Mod: CPTII,S$GLB,, | Performed by: PSYCHIATRY & NEUROLOGY

## 2022-10-11 PROCEDURE — 95910 NRV CNDJ TEST 7-8 STUDIES: CPT | Mod: S$GLB,,, | Performed by: PSYCHIATRY & NEUROLOGY

## 2022-10-11 PROCEDURE — 3074F SYST BP LT 130 MM HG: CPT | Mod: CPTII,S$GLB,, | Performed by: PSYCHIATRY & NEUROLOGY

## 2022-10-11 PROCEDURE — 1160F PR REVIEW ALL MEDS BY PRESCRIBER/CLIN PHARMACIST DOCUMENTED: ICD-10-PCS | Mod: CPTII,S$GLB,, | Performed by: PSYCHIATRY & NEUROLOGY

## 2022-10-11 PROCEDURE — 1159F MED LIST DOCD IN RCRD: CPT | Mod: CPTII,S$GLB,, | Performed by: PSYCHIATRY & NEUROLOGY

## 2022-10-11 PROCEDURE — 99999 PR PBB SHADOW E&M-EST. PATIENT-LVL III: CPT | Mod: PBBFAC,,, | Performed by: PSYCHIATRY & NEUROLOGY

## 2022-10-11 PROCEDURE — 3008F PR BODY MASS INDEX (BMI) DOCUMENTED: ICD-10-PCS | Mod: CPTII,S$GLB,, | Performed by: PSYCHIATRY & NEUROLOGY

## 2022-10-11 PROCEDURE — 1159F PR MEDICATION LIST DOCUMENTED IN MEDICAL RECORD: ICD-10-PCS | Mod: CPTII,S$GLB,, | Performed by: PSYCHIATRY & NEUROLOGY

## 2022-10-11 PROCEDURE — 99203 OFFICE O/P NEW LOW 30 MIN: CPT | Mod: 25,S$GLB,, | Performed by: PSYCHIATRY & NEUROLOGY

## 2022-10-11 PROCEDURE — 3074F PR MOST RECENT SYSTOLIC BLOOD PRESSURE < 130 MM HG: ICD-10-PCS | Mod: CPTII,S$GLB,, | Performed by: PSYCHIATRY & NEUROLOGY

## 2022-10-11 PROCEDURE — 3079F PR MOST RECENT DIASTOLIC BLOOD PRESSURE 80-89 MM HG: ICD-10-PCS | Mod: CPTII,S$GLB,, | Performed by: PSYCHIATRY & NEUROLOGY

## 2022-10-11 PROCEDURE — 95910 PR NERVE CONDUCTION STUDY; 7-8 STUDIES: ICD-10-PCS | Mod: S$GLB,,, | Performed by: PSYCHIATRY & NEUROLOGY

## 2022-10-11 PROCEDURE — 99999 PR PBB SHADOW E&M-EST. PATIENT-LVL III: ICD-10-PCS | Mod: PBBFAC,,, | Performed by: PSYCHIATRY & NEUROLOGY

## 2022-10-11 PROCEDURE — 3008F BODY MASS INDEX DOCD: CPT | Mod: CPTII,S$GLB,, | Performed by: PSYCHIATRY & NEUROLOGY

## 2022-10-11 PROCEDURE — 3079F DIAST BP 80-89 MM HG: CPT | Mod: CPTII,S$GLB,, | Performed by: PSYCHIATRY & NEUROLOGY

## 2022-10-11 PROCEDURE — 95886 MUSC TEST DONE W/N TEST COMP: CPT | Mod: S$GLB,,, | Performed by: PSYCHIATRY & NEUROLOGY

## 2022-10-11 PROCEDURE — 99203 PR OFFICE/OUTPT VISIT, NEW, LEVL III, 30-44 MIN: ICD-10-PCS | Mod: 25,S$GLB,, | Performed by: PSYCHIATRY & NEUROLOGY

## 2022-10-11 PROCEDURE — 95886 PR EMG COMPLETE, W/ NERVE CONDUCTION STUDIES, 5+ MUSCLES: ICD-10-PCS | Mod: S$GLB,,, | Performed by: PSYCHIATRY & NEUROLOGY

## 2022-10-11 NOTE — PROGRESS NOTES
[facility] [address]       Patient received an EMG and nerve conduction test today.  Please refer to the full procedure report which is found in the media section of chart review.    Ángel Mcintosh MD, FAAN  Neurology

## 2022-10-11 NOTE — PROGRESS NOTES
[facility] [address]     Subjective:       Patient ID: Alonzo Turk is a 57 y.o. male here for a EMG focused evaluation for arm pain. Previous visits and diagnostic evaluation reviewed.  Patient describes intermittent neck pain which sometimes radiates down the left arm.  He also describes numbness and tingling of the left 1st 3 digits.  Symptoms have been progressively worsening and moderately severe at times.   HPI  Review of patient's allergies indicates:  No Known Allergies   Vitals:    10/11/22 1524   BP: 122/87   Pulse: 84   Resp: 18   Temp: 97.8 °F (36.6 °C)      Chief Complaint: Pain (Elbow pain)    Past Medical History:   Diagnosis Date    Gastric ulcer     Hyperlipidemia       Social History     Socioeconomic History    Marital status: Single   Tobacco Use    Smoking status: Never    Smokeless tobacco: Never   Substance and Sexual Activity    Alcohol use: No      Review of Systems:   No Fever  No SOB  No vomiting  No visual disturbance      Objective:      Physical Exam    Constitutional: Patient appears well-nourished.   Head: Normocephalic and atraumatic.   Mouth/Throat: Oropharynx is clear and moist.   Pulmonary/Chest: Effort normal.   Abdominal: Soft.   Skin: Skin is warm and dry.      General:  Patient is alert and cooperative.  Affect:  Patient is appropriate to surroundings and environment.  Language:  Speech is fluent.  HEENT:  There are no outward signs of trauma to head or face.  Cranial Nerves:  Pupils are equal round and reactive to light. Extra-ocular movements are intact. Face, tongue, and palate are symmetrical.  Motor:  Patient exhibits normal strength testing in bilateral proximal and distal upper extremities.  Reflexes:  Symmetrical in bilateral upper extremities.  Gait:  Ambulation is independent without use of cane or walker without signs of ataxia or circumduction.  Cerebellar:  Normal finger to nose testing without  dysmetria.  Sensory:  Intact to sensory modalities tested.  Musculoskeletal:  There is no severe tenderness to palpation and manipulation of the cervical spine region.   Assessment:       We reviewed and discussed at length results of EMG of the left upper extremity performed today which was normal without significant evidence of cervical radiculopathy or carpal tunnel syndrome. These findings are available via media section of chart review.   1. Pain of left upper extremity    2. Neck pain              Plan:       We discussed treatment options at length.  Consider a repeat study in 8-10 months of symptoms of numbness persist or worsen.  Recommend patient keep appointment with referring provider.         Ángel Mcintosh MD   10/11/2022   3:48 PM

## 2022-10-11 NOTE — TELEPHONE ENCOUNTER
----- Message from Sandra Su sent at 10/10/2022  4:47 PM CDT -----  Regarding: appointment  Name of Who is Calling: Sung           What is the request in detail: Patient is requesting a call back to schedule EMG           Can the clinic reply by [facility]: No           What Number to Call Back if not in [facility]: [phone]

## 2022-10-17 ENCOUNTER — TELEPHONE (OUTPATIENT)
Dept: ORTHOPEDICS | Facility: CLINIC | Age: 57
End: 2022-10-17
Payer: COMMERCIAL

## 2022-10-17 ENCOUNTER — OFFICE VISIT (OUTPATIENT)
Dept: ORTHOPEDICS | Facility: CLINIC | Age: 57
End: 2022-10-17
Payer: COMMERCIAL

## 2022-10-17 ENCOUNTER — HOSPITAL ENCOUNTER (OUTPATIENT)
Dept: RADIOLOGY | Facility: HOSPITAL | Age: 57
Discharge: HOME | End: 2022-10-17
Attending: ORTHOPAEDIC SURGERY
Payer: COMMERCIAL

## 2022-10-17 VITALS — BODY MASS INDEX: 23.25 KG/M2 | WEIGHT: 162.06 LBS

## 2022-10-17 DIAGNOSIS — M50.30 DDD (DEGENERATIVE DISC DISEASE), CERVICAL: ICD-10-CM

## 2022-10-17 DIAGNOSIS — M50.30 DDD (DEGENERATIVE DISC DISEASE), CERVICAL: Primary | ICD-10-CM

## 2022-10-17 DIAGNOSIS — M54.12 CERVICAL RADICULOPATHY: ICD-10-CM

## 2022-10-17 DIAGNOSIS — M47.812 CERVICAL SPONDYLOSIS: Primary | ICD-10-CM

## 2022-10-17 DIAGNOSIS — M48.02 SPINAL STENOSIS, CERVICAL REGION: ICD-10-CM

## 2022-10-17 PROCEDURE — 1160F RVW MEDS BY RX/DR IN RCRD: CPT | Mod: CPTII,S$GLB,, | Performed by: ORTHOPAEDIC SURGERY

## 2022-10-17 PROCEDURE — 72050 X-RAY EXAM NECK SPINE 4/5VWS: CPT | Mod: TC

## 2022-10-17 PROCEDURE — 99213 OFFICE O/P EST LOW 20 MIN: CPT | Mod: S$GLB,,, | Performed by: ORTHOPAEDIC SURGERY

## 2022-10-17 PROCEDURE — 1159F MED LIST DOCD IN RCRD: CPT | Mod: CPTII,S$GLB,, | Performed by: ORTHOPAEDIC SURGERY

## 2022-10-17 PROCEDURE — 1160F PR REVIEW ALL MEDS BY PRESCRIBER/CLIN PHARMACIST DOCUMENTED: ICD-10-PCS | Mod: CPTII,S$GLB,, | Performed by: ORTHOPAEDIC SURGERY

## 2022-10-17 PROCEDURE — 72050 X-RAY EXAM NECK SPINE 4/5VWS: CPT | Mod: 26,,, | Performed by: RADIOLOGY

## 2022-10-17 PROCEDURE — 99999 PR PBB SHADOW E&M-EST. PATIENT-LVL III: CPT | Mod: PBBFAC,,, | Performed by: ORTHOPAEDIC SURGERY

## 2022-10-17 PROCEDURE — 1159F PR MEDICATION LIST DOCUMENTED IN MEDICAL RECORD: ICD-10-PCS | Mod: CPTII,S$GLB,, | Performed by: ORTHOPAEDIC SURGERY

## 2022-10-17 PROCEDURE — 72050 XR CERVICAL SPINE AP LAT WITH FLEX EXTEN: ICD-10-PCS | Mod: 26,,, | Performed by: RADIOLOGY

## 2022-10-17 PROCEDURE — 99213 PR OFFICE/OUTPT VISIT, EST, LEVL III, 20-29 MIN: ICD-10-PCS | Mod: S$GLB,,, | Performed by: ORTHOPAEDIC SURGERY

## 2022-10-17 PROCEDURE — 99999 PR PBB SHADOW E&M-EST. PATIENT-LVL III: ICD-10-PCS | Mod: PBBFAC,,, | Performed by: ORTHOPAEDIC SURGERY

## 2022-10-17 RX ORDER — METHYLPREDNISOLONE 4 MG/1
TABLET ORAL
Qty: 21 TABLET | Refills: 0 | Status: SHIPPED | OUTPATIENT
Start: 2022-10-17 | End: 2022-11-01

## 2022-10-17 RX ORDER — METHOCARBAMOL 500 MG/1
500 TABLET, FILM COATED ORAL 3 TIMES DAILY
Qty: 60 TABLET | Refills: 1 | Status: SHIPPED | OUTPATIENT
Start: 2022-10-17

## 2022-10-17 RX ORDER — MELOXICAM 15 MG/1
15 TABLET ORAL DAILY
Qty: 60 TABLET | Refills: 1 | Status: SHIPPED | OUTPATIENT
Start: 2022-10-17 | End: 2023-02-13

## 2022-10-17 NOTE — PROGRESS NOTES
DATE: 10/17/2022  PATIENT: Alonzo Turk    Attending Physician: Yordan Lowry M.D.    CHIEF COMPLAINT: neck and LUE pain    HISTORY:  Alonzo Turk is a 57 y.o. male here for initial evaluation of neck and left arm pain (Neck - 5, Arm - 5). The pain has been present for 4 years but it got better after conservative management in 2018, now the problem just came back. The patient describes the pain as dull and it radiates down LUE to the fingers. The pain is worse with activity and improved by rest. There is LUE associated numbness and tingling. There is LUE subjective weakness. Prior treatments have included nothing. For the last episode in 2018, he tried PT, medrol dose pack and an injection.    The Patient denies myelopathic symptoms such as handwriting changes or difficulty with buttons/coins/keys. Denies perineal paresthesias, bowel/bladder dysfunction.    The patient does not smoke, have DM or endorse IVDU. The patient is not on any blood thinners and does not take chronic narcotics. He works as a  for [employer].    PAST MEDICAL/SURGICAL HISTORY:  Past Medical History:   Diagnosis Date    Gastric ulcer     Hyperlipidemia      Past Surgical History:   Procedure Laterality Date    ESOPHAGOGASTRODUODENOSCOPY N/A 1/4/2019    Procedure: ESOPHAGOGASTRODUODENOSCOPY (EGD);  Surgeon: Kodak Freeman MD;  Location: [address];  Service: Endoscopy;  Laterality: N/A;  6 months ago pt had outside EGD with multiple ulcers in the stomach. Has been on PPI 6 months. GI rec repeat scope to document healing. Pt was asymptomatic at time .       Current Medications:   Current Outpatient Medications:     atorvastatin (LIPITOR) 20 MG tablet, TAKE 1 TABLET BY MOUTH EVERY DAY, Disp: 90 tablet, Rfl: 0    meloxicam (MOBIC) 15 MG tablet, Take 1 tablet (15 mg total) by mouth once daily., Disp: 60 tablet, Rfl: 1    methocarbamoL (ROBAXIN) 500 MG Tab, Take 1 tablet (500 mg total) by mouth 3 (three) times daily., Disp: 60 tablet,  Rfl: 1    methylPREDNISolone (MEDROL DOSEPACK) 4 mg tablet, use as directed, Disp: 21 tablet, Rfl: 0    Social History:   Social History     Socioeconomic History    Marital status: Single   Tobacco Use    Smoking status: Never    Smokeless tobacco: Never   Substance and Sexual Activity    Alcohol use: No       REVIEW OF SYSTEMS:  Constitution: Negative. Negative for chills, fever and night sweats.   Cardiovascular: Negative for chest pain and syncope.   Respiratory: Negative for cough and shortness of breath.   Gastrointestinal: See HPI. Negative for nausea/vomiting. Negative for abdominal pain.  Genitourinary: See HPI. Negative for discoloration or dysuria.  Skin: Negative for dry skin, itching and rash.   Hematologic/Lymphatic: negative for bleeding/clotting disorders.   Musculoskeletal: Negative for falls and muscle weakness.   Neurological: See HPI. no history of seizures. no history of cranial surgery or shunts.  Endocrine: Negative for polydipsia, polyphagia and polyuria.   Allergic/Immunologic: Negative for hives and persistent infections.  Psychiatric/Behavioral: Negative for depression and insomnia.         EXAM:  Wt 73.5 kg (162 lb 0.6 oz)   BMI 23.25 kg/m²     General: The patient is a 57 y.o. male in no apparent distress, the patient is orientatied to person, place and time.  Psych: Normal mood and affect  HEENT: Vision grossly intact, hearing intact to the spoken word.  Lungs: Respirations unlabored.  Gait: Normal station and gait, no difficulty with toe or heel walk.   Skin: Cervical skin negative for rashes, lesions, hairy patches and surgical scars.  Range of motion: Cervical range of motion is acceptable. There is posterior cervical tenderness to palpation.  Spinal Balance: Global saggital and coronal spinal balance acceptable, no significant for scoliosis and kyphosis.  Musculoskeletal: No pain with the range of motion of the bilateral shoulders and elbows. Normal bulk and contour of the  bilateral hands.  Vascular: Bilateral hands warm and well perfused, Radial pulses 2+ bilaterally.  Neurological: Normal strength and tone in all major motor groups in the bilateral upper and lower extremities. Normal sensation to light touch in the C5-T1 and L2-S1 dermatomes bilaterally.  Deep tendon reflexes symmetric 2+ in the bilateral upper and lower extremities.  Negative Inverted Radial Reflex and Soni's bilaterally. Negative Babinski bilaterally.     IMAGING:   Today I independently reviewed the following images and my interpretations are as follows:    AP, Lat and Flex/Ex  upright C-spine films demonstrate cervical spondylosis and DDD, worst at C6-7.     Body mass index is 23.25 kg/m².  Hemoglobin A1C   Date Value Ref Range Status   12/31/2019 5.5 4.0 - 5.6 % Final     Comment:     ADA Screening Guidelines:  5.7-6.4%  Consistent with prediabetes  >or=6.5%  Consistent with diabetes  High levels of fetal hemoglobin interfere with the HbA1C  assay. Heterozygous hemoglobin variants (HbS, HgC, etc)do  not significantly interfere with this assay.   However, presence of multiple variants may affect accuracy.     07/13/2018 5.5 4.0 - 5.6 % Final     Comment:     ADA Screening Guidelines:  5.7-6.4%  Consistent with prediabetes  >or=6.5%  Consistent with diabetes  High levels of fetal hemoglobin interfere with the HbA1C  assay. Heterozygous hemoglobin variants (HbS, HgC, etc)do  not significantly interfere with this assay.   However, presence of multiple variants may affect accuracy.         ASSESSMENT/PLAN:  Cervical spondylosis    DDD (degenerative disc disease), cervical    Cervical radiculopathy  -     Ambulatory referral/consult to Physical/Occupational Therapy; Future; Expected date: 10/24/2022  -     MRI Cervical Spine Without Contrast; Future; Expected date: 10/17/2022  -     methylPREDNISolone (MEDROL DOSEPACK) 4 mg tablet; use as directed  Dispense: 21 tablet; Refill: 0  -     meloxicam (MOBIC) 15 MG  tablet; Take 1 tablet (15 mg total) by mouth once daily.  Dispense: 60 tablet; Refill: 1  -     methocarbamoL (ROBAXIN) 500 MG Tab; Take 1 tablet (500 mg total) by mouth 3 (three) times daily.  Dispense: 60 tablet; Refill: 1    Spinal stenosis, cervical region  -     MRI Cervical Spine Without Contrast; Future; Expected date: 10/17/2022      Follow up in about 4 weeks (around 11/14/2022).    Patient has cervical DDD and LUE radiculopathy. I discussed the natural history of their diagnoses as well as surgical and nonsurgical treatment options. I educated the patient on the importance of core/back strengthening, correct posture, bending/lifting ergonomics, and low-impact aerobic exercises (walking, elliptical, and aquatherapy). I prescribed mobic, robaxin and medrol dose pack. I will refer the patient to PT for neck ROM. I ordered cervical MRI to evaluate stenosis. Patient will follow up in 4 weeks for MRI review.    Yordan Lowry MD  Orthopaedic Spine Surgeon  Department of Orthopaedic Surgery  [phone]

## 2022-10-18 ENCOUNTER — HOSPITAL ENCOUNTER (OUTPATIENT)
Dept: RADIOLOGY | Facility: HOSPITAL | Age: 57
Discharge: HOME | End: 2022-10-18
Attending: ORTHOPAEDIC SURGERY
Payer: COMMERCIAL

## 2022-10-18 DIAGNOSIS — R20.2 TINGLING OF LEFT UPPER EXTREMITY: ICD-10-CM

## 2022-10-18 DIAGNOSIS — M25.522 CHRONIC ELBOW PAIN, LEFT: ICD-10-CM

## 2022-10-18 DIAGNOSIS — M77.12 LEFT LATERAL EPICONDYLITIS: ICD-10-CM

## 2022-10-18 DIAGNOSIS — G89.29 CHRONIC ELBOW PAIN, LEFT: ICD-10-CM

## 2022-10-18 PROCEDURE — 73221 MRI JOINT UPR EXTREM W/O DYE: CPT | Mod: TC,LT

## 2022-10-18 PROCEDURE — 73221 MRI ELBOW WITHOUT CONTRAST LEFT: ICD-10-PCS | Mod: 26,LT,, | Performed by: RADIOLOGY

## 2022-10-18 PROCEDURE — 73221 MRI JOINT UPR EXTREM W/O DYE: CPT | Mod: 26,LT,, | Performed by: RADIOLOGY

## 2022-10-31 ENCOUNTER — HOSPITAL ENCOUNTER (OUTPATIENT)
Dept: RADIOLOGY | Facility: HOSPITAL | Age: 57
Discharge: HOME | End: 2022-10-31
Attending: ORTHOPAEDIC SURGERY
Payer: COMMERCIAL

## 2022-10-31 DIAGNOSIS — M54.12 CERVICAL RADICULOPATHY: ICD-10-CM

## 2022-10-31 DIAGNOSIS — M48.02 SPINAL STENOSIS, CERVICAL REGION: ICD-10-CM

## 2022-10-31 PROCEDURE — 72141 MRI CERVICAL SPINE WITHOUT CONTRAST: ICD-10-PCS | Mod: 26,,, | Performed by: RADIOLOGY

## 2022-10-31 PROCEDURE — 72141 MRI NECK SPINE W/O DYE: CPT | Mod: TC

## 2022-10-31 PROCEDURE — 72141 MRI NECK SPINE W/O DYE: CPT | Mod: 26,,, | Performed by: RADIOLOGY

## 2022-11-01 ENCOUNTER — OFFICE VISIT (OUTPATIENT)
Dept: ORTHOPEDICS | Facility: CLINIC | Age: 57
End: 2022-11-01
Payer: COMMERCIAL

## 2022-11-01 VITALS — BODY MASS INDEX: 23.74 KG/M2 | WEIGHT: 165.81 LBS | HEIGHT: 70 IN

## 2022-11-01 DIAGNOSIS — M47.812 CERVICAL SPONDYLOSIS: ICD-10-CM

## 2022-11-01 DIAGNOSIS — M54.12 CERVICAL RADICULOPATHY: Primary | ICD-10-CM

## 2022-11-01 DIAGNOSIS — M50.30 DDD (DEGENERATIVE DISC DISEASE), CERVICAL: ICD-10-CM

## 2022-11-01 PROCEDURE — 99999 PR PBB SHADOW E&M-EST. PATIENT-LVL III: CPT | Mod: PBBFAC,,, | Performed by: ORTHOPAEDIC SURGERY

## 2022-11-01 PROCEDURE — 1160F RVW MEDS BY RX/DR IN RCRD: CPT | Mod: CPTII,S$GLB,, | Performed by: ORTHOPAEDIC SURGERY

## 2022-11-01 PROCEDURE — 99214 PR OFFICE/OUTPT VISIT, EST, LEVL IV, 30-39 MIN: ICD-10-PCS | Mod: S$GLB,,, | Performed by: ORTHOPAEDIC SURGERY

## 2022-11-01 PROCEDURE — 1160F PR REVIEW ALL MEDS BY PRESCRIBER/CLIN PHARMACIST DOCUMENTED: ICD-10-PCS | Mod: CPTII,S$GLB,, | Performed by: ORTHOPAEDIC SURGERY

## 2022-11-01 PROCEDURE — 1159F PR MEDICATION LIST DOCUMENTED IN MEDICAL RECORD: ICD-10-PCS | Mod: CPTII,S$GLB,, | Performed by: ORTHOPAEDIC SURGERY

## 2022-11-01 PROCEDURE — 99214 OFFICE O/P EST MOD 30 MIN: CPT | Mod: S$GLB,,, | Performed by: ORTHOPAEDIC SURGERY

## 2022-11-01 PROCEDURE — 1159F MED LIST DOCD IN RCRD: CPT | Mod: CPTII,S$GLB,, | Performed by: ORTHOPAEDIC SURGERY

## 2022-11-01 PROCEDURE — 99999 PR PBB SHADOW E&M-EST. PATIENT-LVL III: ICD-10-PCS | Mod: PBBFAC,,, | Performed by: ORTHOPAEDIC SURGERY

## 2022-11-01 RX ORDER — METHYLPREDNISOLONE 4 MG/1
TABLET ORAL
Qty: 21 TABLET | Refills: 0 | Status: SHIPPED | OUTPATIENT
Start: 2022-11-01

## 2022-11-01 NOTE — PROGRESS NOTES
"DATE: 11/1/2022  PATIENT: Alonzo Turk    Attending Physician: Yordan Lowry M.D.    HISTORY:  Alonzo Turk is a 57 y.o. male who returns to me today for MRI review. Patient continues to have neck pain that radiates down LUE to the fingers, but the pain has improved with medications. He has PT scheduled to start in a week or so. He wants to wait before trying an injection.    PMH/PSH/FamHx/SocHx:  Unchanged from prior visit    ROS:  Positive for neck and LUE pain  Denies myelopathic symptoms, perineal paresthesias, bowel or bladder incontinence    EXAM:  Ht 5' 10" (1.778 m)   Wt 75.2 kg (165 lb 12.6 oz)   BMI 23.79 kg/m²     My physical examination was notable for the following findings: motor intact BUE; SILT    IMAGING:  Today I independently reviewed the following images and my interpretations are as follows:    Previous AP and Lat upright C-spine films showed cervical spondylosis and DDD, worst at C6-7.     MRI cervical showed no significant central stenosis. L C6-7 mod foraminal stenosis.    ASSESSMENT/PLAN:  Patient has cervical stenosis with LUE radiculopathy. I educated the patient on the importance of core/back strengthening, correct posture, bending/lifting ergonomics, and low-impact aerobic exercises (walking, elliptical, and aquatherapy). I prescribed medrol dose pack. Continue medications and start PT. RTC in PRN. Next step is C6-7 PORF[facility] with Pain Management ([place]).    Yordan Lowry MD  Orthopaedic Spine Surgeon  Department of Orthopaedic Surgery  [phone]  "

## 2022-11-10 ENCOUNTER — TELEPHONE (OUTPATIENT)
Dept: ORTHOPEDICS | Facility: CLINIC | Age: 57
End: 2022-11-10
Payer: COMMERCIAL

## 2022-11-14 ENCOUNTER — OFFICE VISIT (OUTPATIENT)
Dept: ORTHOPEDICS | Facility: CLINIC | Age: 57
End: 2022-11-14
Payer: COMMERCIAL

## 2022-11-14 VITALS — WEIGHT: 158.81 LBS | HEIGHT: 70 IN | BODY MASS INDEX: 22.73 KG/M2

## 2022-11-14 DIAGNOSIS — M54.12 CERVICAL RADICULOPATHY: ICD-10-CM

## 2022-11-14 DIAGNOSIS — M54.9 MID BACK PAIN: Primary | ICD-10-CM

## 2022-11-14 PROCEDURE — 3008F BODY MASS INDEX DOCD: CPT | Mod: CPTII,S$GLB,, | Performed by: ORTHOPAEDIC SURGERY

## 2022-11-14 PROCEDURE — 1160F PR REVIEW ALL MEDS BY PRESCRIBER/CLIN PHARMACIST DOCUMENTED: ICD-10-PCS | Mod: CPTII,S$GLB,, | Performed by: ORTHOPAEDIC SURGERY

## 2022-11-14 PROCEDURE — 1159F PR MEDICATION LIST DOCUMENTED IN MEDICAL RECORD: ICD-10-PCS | Mod: CPTII,S$GLB,, | Performed by: ORTHOPAEDIC SURGERY

## 2022-11-14 PROCEDURE — 1160F RVW MEDS BY RX/DR IN RCRD: CPT | Mod: CPTII,S$GLB,, | Performed by: ORTHOPAEDIC SURGERY

## 2022-11-14 PROCEDURE — 1159F MED LIST DOCD IN RCRD: CPT | Mod: CPTII,S$GLB,, | Performed by: ORTHOPAEDIC SURGERY

## 2022-11-14 PROCEDURE — 99214 OFFICE O/P EST MOD 30 MIN: CPT | Mod: S$GLB,,, | Performed by: ORTHOPAEDIC SURGERY

## 2022-11-14 PROCEDURE — 3008F PR BODY MASS INDEX (BMI) DOCUMENTED: ICD-10-PCS | Mod: CPTII,S$GLB,, | Performed by: ORTHOPAEDIC SURGERY

## 2022-11-14 PROCEDURE — 99999 PR PBB SHADOW E&M-EST. PATIENT-LVL III: ICD-10-PCS | Mod: PBBFAC,,, | Performed by: ORTHOPAEDIC SURGERY

## 2022-11-14 PROCEDURE — 99214 PR OFFICE/OUTPT VISIT, EST, LEVL IV, 30-39 MIN: ICD-10-PCS | Mod: S$GLB,,, | Performed by: ORTHOPAEDIC SURGERY

## 2022-11-14 PROCEDURE — 99999 PR PBB SHADOW E&M-EST. PATIENT-LVL III: CPT | Mod: PBBFAC,,, | Performed by: ORTHOPAEDIC SURGERY

## 2022-11-15 NOTE — PROGRESS NOTES
"DATE: 11/14/2022  PATIENT: Alonzo Turk    Attending Physician: Yordan Lowry M.D.    HISTORY:  Alonzo Turk is a 57 y.o. male who returns to me today for FU. Patient continues to have radiating pain down LUE to the fingers, but his neck pain has resolved. He started having upper mid-back pain that goes to shoulder blades. He did PT and takes meds.    The patient does not smoke, have DM or endorse IVDU. The patient is not on any blood thinners and does not take chronic narcotics. He works as a  for [employer].    PMH/PSH/FamHx/SocHx:  Unchanged from prior visit    ROS:  Positive for mid back and LUE pain  Denies myelopathic symptoms, perineal paresthesias, bowel or bladder incontinence    EXAM:  Ht 5' 10" (1.778 m)   Wt 72 kg (158 lb 13.5 oz)   BMI 22.79 kg/m²     My physical examination was notable for the following findings: motor intact BUE; SILT    IMAGING:  Today I independently reviewed the following images and my interpretations are as follows:    Previous AP and Lat upright C-spine films showed cervical spondylosis and DDD, worst at C6-7.     MRI cervical showed no significant central stenosis. L C6-7 mod foraminal stenosis.    ASSESSMENT/PLAN:  Patient has cervical stenosis with LUE radiculopathy. He has mid back pain so I ordered a thoracic MRI to evaluate stenosis. I educated the patient on the importance of core/back strengthening, correct posture, bending/lifting ergonomics, and low-impact aerobic exercises (walking, elliptical, and aquatherapy). Continue medications and exercises. RTC in 2 weeks for MRI review. Next step is C6-7 PORFIRIO with Pain Management ([facility]).    I have provided the patient with a home exercise program. It is the [employer] cervical exercise program. Exercises include walking erectly with neutral head position, supine neutral head position, supine retraction, sitting or standing neck retraction, posture training, forward/backward/sideward isometric " strengthening, prone head lifts, supine head lifts, scapular retraction, and neck rotation. Pt will complete each exercise twice daily for 6-8 weeks.    Yordan Lowry MD  Orthopaedic Spine Surgeon  Department of Orthopaedic Surgery  [phone]

## 2022-11-28 ENCOUNTER — PATIENT MESSAGE (OUTPATIENT)
Dept: ORTHOPEDICS | Facility: CLINIC | Age: 57
End: 2022-11-28
Payer: COMMERCIAL

## 2022-11-29 NOTE — TELEPHONE ENCOUNTER
Tried to reschedule xray and mri and will need new orders because of denial of both.     Please advise!

## 2022-11-30 ENCOUNTER — TELEPHONE (OUTPATIENT)
Dept: ORTHOPEDICS | Facility: CLINIC | Age: 57
End: 2022-11-30
Payer: COMMERCIAL

## 2022-11-30 DIAGNOSIS — M54.9 MID BACK PAIN: Primary | ICD-10-CM

## 2022-11-30 NOTE — TELEPHONE ENCOUNTER
----- Message from Denisse Bennett sent at 11/30/2022  1:56 PM CST -----  Regarding: pt advice  Contact: [phone]  Pt missed a call in regards to making an appt. Also pt needs to schedule MRI. Pls call

## 2022-12-05 ENCOUNTER — CLINICAL SUPPORT (OUTPATIENT)
Dept: REHABILITATION | Facility: HOSPITAL | Age: 57
End: 2022-12-05
Payer: COMMERCIAL

## 2022-12-05 DIAGNOSIS — M54.9 MID BACK PAIN: ICD-10-CM

## 2022-12-05 DIAGNOSIS — R29.898 DECREASED ROM OF NECK: ICD-10-CM

## 2022-12-05 DIAGNOSIS — R29.898 DECREASED STRENGTH OF UPPER EXTREMITY: ICD-10-CM

## 2022-12-05 PROCEDURE — 97110 THERAPEUTIC EXERCISES: CPT | Mod: PN

## 2022-12-05 PROCEDURE — 97161 PT EVAL LOW COMPLEX 20 MIN: CPT | Mod: PN
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
## 2022-12-19 ENCOUNTER — CLINICAL SUPPORT (OUTPATIENT)
Dept: REHABILITATION | Facility: HOSPITAL | Age: 57
End: 2022-12-19
Payer: COMMERCIAL

## 2022-12-19 DIAGNOSIS — R29.898 DECREASED STRENGTH OF UPPER EXTREMITY: Primary | ICD-10-CM

## 2022-12-19 DIAGNOSIS — R29.898 DECREASED ROM OF NECK: ICD-10-CM

## 2022-12-19 PROCEDURE — 97110 THERAPEUTIC EXERCISES: CPT | Mod: PN,CQ

## 2022-12-19 NOTE — PATIENT INSTRUCTIONS
"Strengthening: Resisted External Rotation    Hold Red elastic band in left hand, elbow at side and hand in front of belly button. Rotate forearm out, keeping elbow close to side.  Repeat 10 times left side per set. Do 1 sets per session. Do 2 sessions per day.    Strengthening: Resisted Row    Face anchor at waist height, medium to wide stance. Thumbs up, pull arms back, squeezing shoulder blades together. Do not shrug up. Slowly return to start.  Repeat 10 times each side per set. Do 1 sets per session. Do 2 sessions per day.    Scapular Retraction: "T" (Eccentric) - Prone (Ball)        Lie on your stomach with one arm off the edge. Raise the arm up like a "T" with your thumb up, keeping elbows straight.   Repeat 10 times each side per set. Do 1 sets per session. Do 2 sessions per day.     https://ecce.[employer].us/220     Copyright © I. All rights reserved.      Wall Slides    Place both forearms on wall with elbows slightly narrower than wrists. Slide arms up wall, keeping elbows close together. Slowly lower.   Repeat 12 times per set. Do 1 sets per session. Do 2 sessions per day.        "

## 2022-12-19 NOTE — PROGRESS NOTES
"[facility]   Physical Therapy Treatment Note     Name: Alonzo Turk  Clinic Number: [id number]    Therapy Diagnosis:   Encounter Diagnoses   Name Primary?    Decreased strength of upper extremity Yes    Decreased ROM of neck      Physician: Yordan Lowry MD    Visit Date: 12/19/2022    Physician Orders: PT Eval and Treat   Medical Diagnosis from Referral: M54.9 (ICD-10-CM) - Mid back pain  Evaluation Date: 12/5/2022  Authorization Period Expiration: 11/30/23  Plan of Care Expiration: 2/3/23  Visit # / Visits authorized: 2/ 1  FOTO: 2/10  PTA Visit #: 1/5     Time In: 11:00 am  Time Out: 11:54 am  Total Billable Time: 54 minutes (TE-4)     Precautions: Standard    SUBJECTIVE     Pt reports: having pain going down his left arm and in his upper back.  He was compliant with home exercise program.  Response to previous treatment: no problems.  Functional change: not at this time.    Pain: 4/10  Location: left arm, fingers, and shoulder.      OBJECTIVE     Objective Measures updated at progress report unless specified.     Treatment     Alonzo received the treatments listed below:      therapeutic exercises to develop strength, endurance, ROM, flexibility, and posture for 54 minutes including:    Snags rotation x10   Cervical extension with towel 10x  No money red theraband 15x5"  Open book pain free range 10x5" bilateral   Prone Ts left: x10 with scapula retraction      Serratus wall slides: x12   Theraband rows: 2x10 green theraband   Theraband external rotation left: 2x10 red theraband       Patient Education and Home Exercises     Home Exercises Provided and Patient Education Provided     Education provided:   - keep exercises in a pain free range.    Written Home Exercises Provided: yes. Exercises were reviewed and Alonzo was able to demonstrate them prior to the end of the session.  Alonzo demonstrated good  understanding of the education provided. See EMR under Patient Instructions for exercises " provided during therapy sessions    ASSESSMENT     Alonzo is a 57 y.o. male referred to outpatient Physical Therapy with a medical diagnosis of mid back pain. Pt presents with chronic neck pain and upper back pain with left radiating symptoms consistent with C6 radiculopathy. Patient requires occasional cues for proper execution of exercises.  Patient requires occasional cues for scapula positioning.     Alonzo Is progressing well towards his goals.   Pt prognosis is Good.     Pt will continue to benefit from skilled outpatient physical therapy to address the deficits listed in the problem list box on initial evaluation, provide pt/family education and to maximize pt's level of independence in the home and community environment.     Pt's spiritual, cultural and educational needs considered and pt agreeable to plan of care and goals.     Anticipated barriers to physical therapy: co-morbidities.    Goals:  Short Term Goals (4 Weeks):   1. Pt will be independent with HEP to supplement PT in improving pain free cervical mobility.  2. Pt will improve cervical rotation AROM 10 deg to improve mobility for driving.   3. Pt will improve UE MMTs by 1/2 grade in all planes to improve strength for overhead tasks.  4. Pt will report decreased numbness and tingling by 25%.     Long Term Goals (8 Weeks):   1. Pt will improve FOTO to </=40% limitation to improve perceived limitation with changing and maintaining mobility.  2. Pt will improve cervical extension AROM to 25 to improve mobility for overhead tasks.  3. Pt will improve UE MMTs 1 grade in all planes to improve strength for lifting and carrying tasks.  4. Pt will report no pain with lifting 10lbs overhead.   5. Pt will report no pain with cervical AROM in all planes to promote unlimited functional mobility.     PLAN     Continue with Plan Of Care and progress toward therapist goals.    Drake Bang, PTA

## 2023-01-09 ENCOUNTER — CLINICAL SUPPORT (OUTPATIENT)
Dept: REHABILITATION | Facility: HOSPITAL | Age: 58
End: 2023-01-09
Payer: COMMERCIAL

## 2023-01-09 DIAGNOSIS — R29.898 DECREASED ROM OF NECK: ICD-10-CM

## 2023-01-09 DIAGNOSIS — R29.898 DECREASED STRENGTH OF UPPER EXTREMITY: Primary | ICD-10-CM

## 2023-01-09 PROCEDURE — 97140 MANUAL THERAPY 1/> REGIONS: CPT | Mod: PN

## 2023-01-09 PROCEDURE — 97110 THERAPEUTIC EXERCISES: CPT | Mod: PN

## 2023-01-09 NOTE — PROGRESS NOTES
"[facility]   Physical Therapy Treatment Note     Name: Alonzo Turk  Clinic Number: [id number]    Therapy Diagnosis:   Encounter Diagnoses   Name Primary?    Decreased strength of upper extremity Yes    Decreased ROM of neck        Physician: Yordan Lowry MD    Visit Date: 1/9/2023    Physician Orders: PT Eval and Treat   Medical Diagnosis from Referral: M54.9 (ICD-10-CM) - Mid back pain  Evaluation Date: 12/5/2022  Authorization Period Expiration: 12/31/23  Plan of Care Expiration: 2/3/23  Visit # / Visits authorized: 1/40 (3 total)  FOTO: 3/10  PTA Visit #: 1/5     Time In: 10:04 am  Time Out: 10:57 am  Total Billable Time: 53 minutes (TE-4)     Precautions: Standard    SUBJECTIVE     Pt reports: back pain getting a little better. He is doing therapy to get the mri. He has tingling into first three digits   He was compliant with home exercise program.  Response to previous treatment: no problems.  Functional change: not at this time.    Pain: 4/10  Location: left arm, fingers, and shoulder.      OBJECTIVE     Objective Measures updated at progress report unless specified.     Treatment     Alonzo received the treatments listed below:      therapeutic exercises to develop strength, endurance, ROM, flexibility, and posture for 43 minutes including:    Snags rotation x10   Cervical extension with towel 10x  No money red theraband 15x5"  Open book pain free range 10x5" bilateral   Prone Ts left: x10 with scapula retraction   Prone shoulder extension 2x10 left   Chin tucks standing with mirror feedback 20x5"      Serratus wall slides: x12   Theraband rows: 2x10 green theraband   Theraband external rotation left: 2x10 red theraband   Seated thoracic extension 1/2 20x   Median nerve glide 15x    Next: shoulder extension green theraband     Alonzo received the following manual therapy techniques: Joint mobilizations and Soft tissue Mobilization were applied to the: neck and upper back for 10 " minutes, including:  Cervical manual distraction  Cervical side glides grade III  Thoracic p/a grade III      Patient Education and Home Exercises     Home Exercises Provided and Patient Education Provided     Education provided:   - keep exercises in a pain free range.    Written Home Exercises Provided: yes. Exercises were reviewed and Alonzo was able to demonstrate them prior to the end of the session.  Alonzo demonstrated good  understanding of the education provided. See EMR under Patient Instructions for exercises provided during therapy sessions    ASSESSMENT     Alonzo is a 57 y.o. male referred to outpatient Physical Therapy with a medical diagnosis of mid back pain. Pt presents with chronic neck pain and upper back pain with left radiating symptoms consistent with C6 radiculopathy. Pt tolerated addition of deep neck flexor strengthening with utilization of mirror for biofeedback for form. Continued to work on periscapular strengthening. He required mod cuing for scapular positioning. No increased pain reported post treatment. Monitor response and progress as tolerated.       Alonzo Is progressing well towards his goals.   Pt prognosis is Good.     Pt will continue to benefit from skilled outpatient physical therapy to address the deficits listed in the problem list box on initial evaluation, provide pt/family education and to maximize pt's level of independence in the home and community environment.     Pt's spiritual, cultural and educational needs considered and pt agreeable to plan of care and goals.     Anticipated barriers to physical therapy: co-morbidities.    Goals:  Short Term Goals (4 Weeks):   1. Pt will be independent with HEP to supplement PT in improving pain free cervical mobility.progressing, not met  2. Pt will improve cervical rotation AROM 10 deg to improve mobility for driving. progressing, not met  3. Pt will improve UE MMTs by 1/2 grade in all planes to improve strength for overhead  tasks.progressing, not met  4. Pt will report decreased numbness and tingling by 25%.progressing, not met     Long Term Goals (8 Weeks):   1. Pt will improve FOTO to </=40% limitation to improve perceived limitation with changing and maintaining mobility.progressing, not met  2. Pt will improve cervical extension AROM to 25 to improve mobility for overhead tasks.progressing, not met  3. Pt will improve UE MMTs 1 grade in all planes to improve strength for lifting and carrying tasks.progressing, not met  4. Pt will report no pain with lifting 10lbs overhead. progressing, not met  5. Pt will report no pain with cervical AROM in all planes to promote unlimited functional mobility. progressing, not met    PLAN     Continue with Plan Of Care and progress toward therapist goals.    NOEL WILHELM, PT          occupational therapy

## 2023-01-23 ENCOUNTER — CLINICAL SUPPORT (OUTPATIENT)
Dept: REHABILITATION | Facility: HOSPITAL | Age: 58
End: 2023-01-23
Payer: COMMERCIAL

## 2023-01-23 DIAGNOSIS — R29.898 DECREASED ROM OF NECK: ICD-10-CM

## 2023-01-23 DIAGNOSIS — R29.898 DECREASED STRENGTH OF UPPER EXTREMITY: Primary | ICD-10-CM

## 2023-01-23 PROCEDURE — 97110 THERAPEUTIC EXERCISES: CPT | Mod: PN,CQ

## 2023-01-23 PROCEDURE — 97140 MANUAL THERAPY 1/> REGIONS: CPT | Mod: PN,CQ

## 2023-01-23 NOTE — PROGRESS NOTES
"[facility]   Physical Therapy Treatment Note     Name: Alonzo Turk  Clinic Number: [id number]    Therapy Diagnosis:   Encounter Diagnoses   Name Primary?    Decreased strength of upper extremity Yes    Decreased ROM of neck        Physician: Yordan Lowry MD    Visit Date: 1/23/2023    Physician Orders: PT Eval and Treat   Medical Diagnosis from Referral: M54.9 (ICD-10-CM) - Mid back pain  Evaluation Date: 12/5/2022  Authorization Period Expiration: 12/31/23  Plan of Care Expiration: 2/3/23  Visit # / Visits authorized: 3/40 + eval  FOTO: 3/10  PTA Visit #: 3/5     Time In: 10:01 am  Time Out: 11:04 am  Total Billable Time: 63 minutes (TE-3, MT-1)     Precautions: Standard    SUBJECTIVE     Pt reports: no changes since last visit. Pt reported continued n/t to L hand/first 3 digits.   He was compliant with home exercise program.  Response to previous treatment: no problems.  Functional change: not at this time.    Pain: 5/10  Location: left arm, fingers, and shoulder.      OBJECTIVE     Objective Measures updated at progress report unless specified.     Treatment     Sunstar received the treatments listed below:      therapeutic exercises to develop strength, endurance, ROM, flexibility, and posture for 47 minutes including:    Snags rotation x10   Cervical extension with towel 10x  No money red theraband 15x5"  Open book pain free range 10x5" bilateral   Prone Ts left: x10 with scapula retraction   Prone shoulder extension 2x10 left   Chin tucks standing with mirror feedback 20x5"      Serratus wall slides: x12   Theraband rows: 2x10 green theraband   Theraband external rotation left: 2x10 red theraband  -> not today  Seated thoracic extension full FR 20x   Median nerve glide 15x  shoulder extension green theraband : 2x10 5" red today-> progress to green next    Alonzo received the following manual therapy techniques: Joint mobilizations and Soft tissue Mobilization were applied to the: neck " "and upper back for 16 minutes, including:  Cervical manual distraction  MET for C5 L rotation: 5x5"  Passive L UT stretch    Not today:  Cervical side glides grade III  Thoracic p/a grade III      Patient Education and Home Exercises     Home Exercises Provided and Patient Education Provided     Education provided:   - keep exercises in a pain free range.    Written Home Exercises Provided: yes. Exercises were reviewed and Alonzo was able to demonstrate them prior to the end of the session.  Alonzo demonstrated good  understanding of the education provided. See EMR under Patient Instructions for exercises provided during therapy sessions    ASSESSMENT     Pt w/ good camille of instructed exercises today as progressions were made in tx. Pt w/ no c/o increased pain throughout tx but was challenged by thoracic extension 2/2 difficulty w/ technique. Pt required min-mod verbal cues throughout tx to complete exercises w/ proper technique + posture correction. Pt w/ good response to all MT today.     Alonzo Is progressing well towards his goals.   Pt prognosis is Good.     Pt will continue to benefit from skilled outpatient physical therapy to address the deficits listed in the problem list box on initial evaluation, provide pt/family education and to maximize pt's level of independence in the home and community environment.     Pt's spiritual, cultural and educational needs considered and pt agreeable to plan of care and goals.     Anticipated barriers to physical therapy: co-morbidities.    Goals:  Short Term Goals (4 Weeks):   1. Pt will be independent with HEP to supplement PT in improving pain free cervical mobility.progressing, not met  2. Pt will improve cervical rotation AROM 10 deg to improve mobility for driving. progressing, not met  3. Pt will improve UE MMTs by 1/2 grade in all planes to improve strength for overhead tasks.progressing, not met  4. Pt will report decreased numbness and tingling by 25%.progressing, not " met     Long Term Goals (8 Weeks):   1. Pt will improve FOTO to </=40% limitation to improve perceived limitation with changing and maintaining mobility.progressing, not met  2. Pt will improve cervical extension AROM to 25 to improve mobility for overhead tasks.progressing, not met  3. Pt will improve UE MMTs 1 grade in all planes to improve strength for lifting and carrying tasks.progressing, not met  4. Pt will report no pain with lifting 10lbs overhead. progressing, not met  5. Pt will report no pain with cervical AROM in all planes to promote unlimited functional mobility. progressing, not met    PLAN     Continue with Plan Of Care and progress toward therapist goals.    Marlene Kelly, PTA

## 2023-01-27 ENCOUNTER — DOCUMENTATION ONLY (OUTPATIENT)
Dept: REHABILITATION | Facility: HOSPITAL | Age: 58
End: 2023-01-27
Payer: COMMERCIAL

## 2023-01-27 NOTE — PROGRESS NOTES
HR=89 bpm, LCOM=654/79 mmhg, SpO2=99.0 % Physical therapist and physical therapy assistant(s) met face to face to discuss patient's treatment plan and progress towards established goals. Pt will be seen by a physical therapist minimally every 6th visit or every 30 days.    NOEL WILHELM, PT, DPT

## 2023-01-30 ENCOUNTER — CLINICAL SUPPORT (OUTPATIENT)
Dept: REHABILITATION | Facility: HOSPITAL | Age: 58
End: 2023-01-30
Payer: COMMERCIAL

## 2023-01-30 DIAGNOSIS — R29.898 DECREASED STRENGTH OF UPPER EXTREMITY: Primary | ICD-10-CM

## 2023-01-30 DIAGNOSIS — R29.898 DECREASED ROM OF NECK: ICD-10-CM

## 2023-01-30 PROCEDURE — 97110 THERAPEUTIC EXERCISES: CPT | Mod: PN

## 2023-01-30 PROCEDURE — 97140 MANUAL THERAPY 1/> REGIONS: CPT | Mod: PN

## 2023-01-30 NOTE — PROGRESS NOTES
[facility]   Physical Therapy Treatment Note / plan of care     Name: Alonzo Turk  Clinic Number: [id number]    Therapy Diagnosis:   Encounter Diagnoses   Name Primary?    Decreased strength of upper extremity Yes    Decreased ROM of neck          Physician: Yordan Lowry MD    Visit Date: 1/30/2023    Physician Orders: PT Eval and Treat   Medical Diagnosis from Referral: M54.9 (ICD-10-CM) - Mid back pain  Evaluation Date: 12/5/2022  Authorization Period Expiration: 12/31/23  Plan of Care Expiration: 3/3/23  Visit # / Visits authorized: 4/40 + eval  FOTO: 5/10 (complete 2nd 1/30/23)  PTA Visit #: 3/5     Time In: 10:04 am  Time Out: 10:57 am  Total Billable Time: 53 minutes (TE-3, MT-1)     Precautions: Standard    SUBJECTIVE     Pt reports: he is feeling a little better since beginning PT. He continues to have numbness in first 3 digits.    He was compliant with home exercise program.  Response to previous treatment: no problems.  Functional change: not at this time.    Pain: 4/10  Location: left arm, fingers, and shoulder.      OBJECTIVE   Posture: decreased cervical lordosis, Forward head posture  Palpation:  mild tenderness lateral epicondyle CT junction, left C6-7 facets, left Upper trap, left posterior rotator cuff.   Sensation: normal light touch      Range of Motion/Strength:   CERVICAL AROM Pain/Dysfunction with Movement   Flexion 40     Extension 43     Right side bending 30     Left side bending 30     Right rotation 60     Left rotation 60        Shoulder Right Left Pain/Dysfunction with Movement   AROM/PROM         flexion  170  160     extension  Within normal limits   Within normal limits      abduction  170  160*     Internal rotation Within normal limits  Within normal limits      External rotation Within normal limits  Within normal limits          U/E MMT Right Left Pain/Dysfunction with Movement   Shoulder Flexion 5/5 4+/5  numbness left    Shoulder Extension 5/5 5/5    "  Shoulder Abduction 5/5 4+/5     Shoulder IR 5/5 5/5     Shoulder ER    5/5 4+/5     Elbow Flexion  5/5 5/5     Elbow Extension 5/5 5/5     Wrist flexion  5/5 5/5  numbness left    Wrist extension  5/5 5/5     Rhomboids 4+/5 4+/5     Mid Traps 4+/5 4+/5     Low Traps 4+/5 4+/5        Joint Mobility:   - Cervical: hypomobility C5-7  - Thoracic: decreased mobility upper/mid thoracic spine     Special Tests: + spurlings left, negative compression      CMS Impairment/Limitation/Restriction for FOTO upper back Survey     Therapist reviewed FOTO scores for Alonzo Turk on 1/30/2023.   FOTO documents entered into [employer] - see Media section.     Limitation Score: 32%  Category: Carrying     Treatment     Alonzo received the treatments listed below:      therapeutic exercises to develop strength, endurance, ROM, flexibility, and posture for 43 minutes including:  Objective measures   Snags rotation x10 NP  Cervical extension with towel 10x NP  No money red theraband 15x5" NP  Open book pain free range 10x5" bilateral -standing  Prone Ts left: x10 with scapula retraction NP  Prone shoulder extension 2x10 bilateral  5"  Chin tucks standing with mirror feedback 20x5"      Serratus wall slides: x20  Theraband rows: 3x10 green theraband   Theraband external rotation left: 2x10 red theraband  -> not today  Seated thoracic extension full FR 20x   Median nerve glide 15x  shoulder extension green theraband : 2x10 5" green theraband     Aolnzo received the following manual therapy techniques: Joint mobilizations and Soft tissue Mobilization were applied to the: neck and upper back for 10 minutes, including:  Cervical manual distraction  MET for C5 L rotation: 5x5" NP  Passive L UT stretch    Not today:  Cervical side glides grade III  Thoracic p/a grade III      Patient Education and Home Exercises     Home Exercises Provided and Patient Education Provided     Education provided:   - keep exercises in a pain free range.    Written Home " Exercises Provided: yes. Exercises were reviewed and Alonzo was able to demonstrate them prior to the end of the session.  Alonzo demonstrated good  understanding of the education provided. See EMR under Patient Instructions for exercises provided during therapy sessions    ASSESSMENT     Pt has been to 5 visits to date and is making moderate progress towards goals. Signs and symptoms are consistent with cervical radiculopathy. He has made improvements in cervical range of motion and postural strength. Pt demonstrates functional improvement as demonstrated by FOTO score goal met. However, he continues to have similar subjective pain complaints and unchanged numbness and tingling in left first 3 digits. He continues to have impaired joint mobility of cervical and thoracic spine. Some cuing required for appropriate form with strengthening exercises. Pt states primary reason for attending his PT is so he can get an MRI. MRI scheduled for 2/27/23. He will continue to benefit from skilled PT to address remaining deficits.     Alonzo Is progressing well towards his goals.   Pt prognosis is Good.     Pt will continue to benefit from skilled outpatient physical therapy to address the deficits listed in the problem list box on initial evaluation, provide pt/family education and to maximize pt's level of independence in the home and community environment.     Pt's spiritual, cultural and educational needs considered and pt agreeable to plan of care and goals.     Anticipated barriers to physical therapy: co-morbidities.    Goals:  Short Term Goals (4 Weeks):   1. Pt will be independent with HEP to supplement PT in improving pain free cervical mobility.progressing, not met  2. Pt will improve cervical rotation AROM 10 deg to improve mobility for driving. Met  3. Pt will improve UE MMTs by 1/2 grade in all planes to improve strength for overhead tasks.Met  4. Pt will report decreased numbness and tingling by 25%.progressing, not  met     Long Term Goals (8 Weeks):   1. Pt will improve FOTO to </=40% limitation to improve perceived limitation with changing and maintaining mobility.progressing, not met  2. Pt will improve cervical extension AROM to 25 to improve mobility for overhead tasks. met  3. Pt will improve UE MMTs 1 grade in all planes to improve strength for lifting and carrying tasks.progressing, not met  4. Pt will report no pain with lifting 10lbs overhead. progressing, not met  5. Pt will report no pain with cervical AROM in all planes to promote unlimited functional mobility. progressing, not met    PLAN     Plan of care certification 1/30/23-3/3/23 1x/week for 4 weeks     NOEL WILHELM, PT

## 2023-01-30 NOTE — PLAN OF CARE
[facility]   Physical Therapy plan of care     Name: Alonzo Turk  Clinic Number: [id number]    Therapy Diagnosis:   Encounter Diagnoses   Name Primary?    Decreased strength of upper extremity Yes    Decreased ROM of neck          Physician: Yordan Lowry MD    Visit Date: 1/30/2023    Physician Orders: PT Eval and Treat   Medical Diagnosis from Referral: M54.9 (ICD-10-CM) - Mid back pain  Evaluation Date: 12/5/2022  Authorization Period Expiration: 12/31/23  Plan of Care Expiration: 3/3/23  Visit # / Visits authorized: 4/40 + eval  FOTO: 5/10 (complete 2nd 1/30/23)  PTA Visit #: 3/5     Time In: 10:04 am  Time Out: 10:57 am  Total Billable Time: 53 minutes (TE-3, MT-1)     Precautions: Standard    SUBJECTIVE     Pt reports: he is feeling a little better since beginning PT. He continues to have numbness in first 3 digits.    He was compliant with home exercise program.  Response to previous treatment: no problems.  Functional change: not at this time.    Pain: 4/10  Location: left arm, fingers, and shoulder.      OBJECTIVE   Posture: decreased cervical lordosis, Forward head posture  Palpation:  mild tenderness left C6-7 facets  Sensation: normal light touch      Range of Motion/Strength:   CERVICAL AROM Pain/Dysfunction with Movement   Flexion 40     Extension 43     Right side bending 30     Left side bending 30     Right rotation 60     Left rotation 60        Shoulder Right Left Pain/Dysfunction with Movement   AROM/PROM         flexion  170  160     extension  Within normal limits   Within normal limits      abduction  170  160 Numbness left     Internal rotation Within normal limits  Within normal limits      External rotation Within normal limits  Within normal limits          U/E MMT Right Left Pain/Dysfunction with Movement   Shoulder Flexion 5/5 4+/5  numbness left    Shoulder Extension 5/5 5/5     Shoulder Abduction 5/5 4+/5     Shoulder IR 5/5 5/5     Shoulder ER    5/5 4+/5      Elbow Flexion  5/5 5/5     Elbow Extension 5/5 5/5     Wrist flexion  5/5 5/5  numbness left    Wrist extension  5/5 5/5     Rhomboids 4+/5 4+/5     Mid Traps 4+/5 4+/5     Low Traps 4+/5 4+/5        Joint Mobility:   - Cervical: hypomobility C5-7  - Thoracic: decreased mobility upper/mid thoracic spine     Special Tests: + spurlings left, negative compression      CMS Impairment/Limitation/Restriction for FOTO upper back Survey     Therapist reviewed FOTO scores for Alonzo Turk on 1/30/2023.   FOTO documents entered into [employer] - see Media section.     Limitation Score: 32%  Category: Carrying     Treatment     Alonzo received the treatments listed in daily note    Patient Education and Home Exercises     Home Exercises Provided and Patient Education Provided     Education provided:   - keep exercises in a pain free range.    Written Home Exercises Provided: yes. Exercises were reviewed and Alonzo was able to demonstrate them prior to the end of the session.  Alonzo demonstrated good  understanding of the education provided. See EMR under Patient Instructions for exercises provided during therapy sessions    ASSESSMENT     Pt has been to 5 visits to date and is making moderate progress towards goals. Signs and symptoms are consistent with cervical radiculopathy. He has made improvements in cervical range of motion and postural strength. Pt demonstrates functional improvement as demonstrated by FOTO score goal met. However, he continues to have similar subjective pain complaints and unchanged numbness and tingling in left first 3 digits. He continues to have impaired joint mobility of cervical and thoracic spine. Some cuing required for appropriate form with strengthening exercises. Pt states primary reason for attending his PT is so he can get an MRI. MRI scheduled for 2/27/23. He will continue to benefit from skilled PT to address remaining deficits.     Alonzo Is progressing well towards his goals.   Pt prognosis is Good.      Pt will continue to benefit from skilled outpatient physical therapy to address the deficits listed in the problem list box on initial evaluation, provide pt/family education and to maximize pt's level of independence in the home and community environment.     Pt's spiritual, cultural and educational needs considered and pt agreeable to plan of care and goals.     Anticipated barriers to physical therapy: co-morbidities.    Goals:  Short Term Goals (4 Weeks):   1. Pt will be independent with HEP to supplement PT in improving pain free cervical mobility.progressing, not met  2. Pt will improve cervical rotation AROM 10 deg to improve mobility for driving. Met  3. Pt will improve UE MMTs by 1/2 grade in all planes to improve strength for overhead tasks.Met  4. Pt will report decreased numbness and tingling by 25%.progressing, not met     Long Term Goals (8 Weeks):   1. Pt will improve FOTO to </=40% limitation to improve perceived limitation with changing and maintaining mobility.progressing, not met  2. Pt will improve cervical extension AROM to 25 to improve mobility for overhead tasks. met  3. Pt will improve UE MMTs 1 grade in all planes to improve strength for lifting and carrying tasks.progressing, not met  4. Pt will report no pain with lifting 10lbs overhead. progressing, not met  5. Pt will report no pain with cervical AROM in all planes to promote unlimited functional mobility. progressing, not met    PLAN     Plan of care certification 1/30/23-3/3/23 1x/week for 4 weeks     NOEL WILHELM, PT

## 2023-02-04 NOTE — PROGRESS NOTES
"[facility]   Physical Therapy Treatment Note     Name: Alonzo Turk  Clinic Number: [id number]    Therapy Diagnosis:   Encounter Diagnoses   Name Primary?    Decreased strength of upper extremity Yes    Decreased ROM of neck      Physician: Yordan Lowry MD    Visit Date: 2/6/2023    Physician Orders: PT Eval and Treat   Medical Diagnosis from Referral: M54.9 (ICD-10-CM) - Mid back pain  Evaluation Date: 12/5/2022  Authorization Period Expiration: 12/31/23  Plan of Care Expiration: 3/3/23  Visit # / Visits authorized: 4/40 + eval  FOTO: 6/10 (complete 2nd 1/30/23)  PTA Visit #: 0/5     Time In: 1000  Time Out: 1056  Total Billable Time: 49 minutes     Precautions: Standard    SUBJECTIVE     Pt reports: denies any neck pain. States no changes in left hand sensation. Reports constant electrical feeling in thumb, index, and middle finger.  He was compliant with home exercise program.  Response to previous treatment: no problems.  Functional change: not at this time.    Pain: 4/10  Location: left arm, fingers, and shoulder.      OBJECTIVE     - Cervical: hypomobility C5-7  - Thoracic: decreased mobility upper/mid thoracic spine     Special Tests:  - ULTT 1 (median nerve)  - ULTT 4 (ulnar nerve)     Treatment     Alonzo received the treatments listed below:      therapeutic exercises to develop strength, endurance, ROM, flexibility, and posture for 18 minutes including:  Snags rotation - x10  Serratus Punch at 120 - Blue sports cord; 3x10  Serratus wall slides: x20  Median nerve glide 15x  Cervical retraction - x10      Not Today:  Cervical extension with towel 10x NP  No money red theraband 15x5" NP  Open book pain free range 10x5" bilateral -standing  Prone shoulder extension 2x10 bilateral  5"  Chin tucks standing with mirror feedback 20x5"  Theraband rows: 3x10 green theraband   Theraband external rotation left: 2x10 red theraband  -> not today  Seated thoracic extension full FR 20x  shoulder " "extension green theraband : 2x10 5" green therabanjovanni Rosado received the following manual therapy techniques: Joint mobilizations and Soft tissue Mobilization were applied to the: neck and upper back for 15 minutes, including:  Cervical and thoracic mobility testing  Supine thoracic extension mobilization - grade V  Left C5 rotation mobilization - Grade V      Not today:  Cervical side glides grade III  Thoracic p/a grade III      Neuromuscular Re-education activities to improve: Proprioception, Posture, and Motor control for 8 minutes. The following activities were included:  Lower Trap - Level 1; 10 second holds x15  Chin Tucks (supine) with towel roll - 10 second hold holds x15      Therapeutic Activities to improve functional performance for 8 minutes, including:  Education (see below)  Questions and answers    Patient Education and Home Exercises     Home Exercises Provided and Patient Education Provided     Education provided:   - keep exercises in a pain free range.    Written Home Exercises Provided: yes. Exercises were reviewed and Alonzo was able to demonstrate them prior to the end of the session.  Alonzo demonstrated good  understanding of the education provided. See EMR under Patient Instructions for exercises provided during therapy sessions    ASSESSMENT     Unremarkable changes in left hand symptoms since eval. No changes following today's treatments despite cavitations and challenge / fatigue with exercises. Pt states primary reason for attending his PT is so he can get an MRI. MRI scheduled for 2/27/23. Barrier to rehab is patient's overall mindset towards physical therapy.     Alonzo Is progressing well towards his goals.   Pt prognosis is Good.     Pt will continue to benefit from skilled outpatient physical therapy to address the deficits listed in the problem list box on initial evaluation, provide pt/family education and to maximize pt's level of independence in the home and community environment. " Pt's spiritual, cultural and educational needs considered and pt agreeable to plan of care and goals.     Anticipated barriers to physical therapy: co-morbidities.    Goals:  Short Term Goals (4 Weeks):   1. Pt will be independent with HEP to supplement PT in improving pain free cervical mobility.progressing, not met  2. Pt will improve cervical rotation AROM 10 deg to improve mobility for driving. Met  3. Pt will improve UE MMTs by 1/2 grade in all planes to improve strength for overhead tasks.Met  4. Pt will report decreased numbness and tingling by 25%.progressing, not met     Long Term Goals (8 Weeks):   1. Pt will improve FOTO to </=40% limitation to improve perceived limitation with changing and maintaining mobility.progressing, not met  2. Pt will improve cervical extension AROM to 25 to improve mobility for overhead tasks. met  3. Pt will improve UE MMTs 1 grade in all planes to improve strength for lifting and carrying tasks.progressing, not met  4. Pt will report no pain with lifting 10lbs overhead. progressing, not met  5. Pt will report no pain with cervical AROM in all planes to promote unlimited functional mobility. progressing, not met    PLAN     Plan of care certification 1/30/23-3/3/23 1x/week for 4 weeks     Joshua Holden, PT, DPT, OCS

## 2023-02-06 ENCOUNTER — CLINICAL SUPPORT (OUTPATIENT)
Dept: REHABILITATION | Facility: HOSPITAL | Age: 58
End: 2023-02-06
Payer: COMMERCIAL

## 2023-02-06 DIAGNOSIS — R29.898 DECREASED ROM OF NECK: ICD-10-CM

## 2023-02-06 DIAGNOSIS — R29.898 DECREASED STRENGTH OF UPPER EXTREMITY: Primary | ICD-10-CM

## 2023-02-06 PROCEDURE — 97112 NEUROMUSCULAR REEDUCATION: CPT | Mod: PN

## 2023-02-06 PROCEDURE — 97530 THERAPEUTIC ACTIVITIES: CPT | Mod: PN

## 2023-02-06 PROCEDURE — 97140 MANUAL THERAPY 1/> REGIONS: CPT | Mod: PN

## 2023-02-06 PROCEDURE — 97110 THERAPEUTIC EXERCISES: CPT | Mod: PN

## 2023-02-20 ENCOUNTER — CLINICAL SUPPORT (OUTPATIENT)
Dept: REHABILITATION | Facility: HOSPITAL | Age: 58
End: 2023-02-20
Payer: COMMERCIAL

## 2023-02-20 DIAGNOSIS — R29.898 DECREASED ROM OF NECK: ICD-10-CM

## 2023-02-20 DIAGNOSIS — R29.898 DECREASED STRENGTH OF UPPER EXTREMITY: Primary | ICD-10-CM

## 2023-02-20 PROCEDURE — 97530 THERAPEUTIC ACTIVITIES: CPT | Mod: PN,CQ

## 2023-02-20 PROCEDURE — 97110 THERAPEUTIC EXERCISES: CPT | Mod: PN,CQ

## 2023-02-20 PROCEDURE — 97112 NEUROMUSCULAR REEDUCATION: CPT | Mod: PN,CQ

## 2023-02-20 NOTE — PROGRESS NOTES
"[facility]   Physical Therapy Treatment Note     Name: Alonzo Turk  Clinic Number: [id number]    Therapy Diagnosis:   No diagnosis found.    Physician: Yordan Lowry MD    Visit Date: 2/20/2023    Physician Orders: PT Eval and Treat   Medical Diagnosis from Referral: M54.9 (ICD-10-CM) - Mid back pain  Evaluation Date: 12/5/2022  Authorization Period Expiration: 12/31/23  Plan of Care Expiration: 3/3/23  Visit # / Visits authorized: 4/40 + eval  FOTO: 6/10 (complete 2nd 1/30/23)  PTA Visit #: 0/5     Time In: 1000  Time Out: 1056  Total Billable Time: 49 minutes     Precautions: Standard    SUBJECTIVE     Pt reports: denies any neck pain. States no changes in left hand sensation. Reports constant electrical feeling in thumb, index, and middle finger.  He was compliant with home exercise program.  Response to previous treatment: no problems.  Functional change: not at this time.    Pain: 4/10  Location: left arm, fingers, and shoulder.      OBJECTIVE     - Cervical: hypomobility C5-7  - Thoracic: decreased mobility upper/mid thoracic spine     Special Tests:  - ULTT 1 (median nerve)  - ULTT 4 (ulnar nerve)     Treatment     Sung received the treatments listed below:      therapeutic exercises to develop strength, endurance, ROM, flexibility, and posture for 18 minutes including:  Snags rotation - x10  Serratus Punch at 120 - Blue sports cord; 3x10  Serratus wall slides: x20  Median nerve glide 15x  Cervical retraction - x10      Not Today:  Cervical extension with towel 10x NP  No money red theraband 15x5" NP  Open book pain free range 10x5" bilateral -standing  Prone shoulder extension 2x10 bilateral  5"  Chin tucks standing with mirror feedback 20x5"  Theraband rows: 3x10 green theraband   Theraband external rotation left: 2x10 red theraband  -> not today  Seated thoracic extension full FR 20x  shoulder extension green theraband : 2x10 5" green theraband      Sung received the following " manual therapy techniques: Joint mobilizations and Soft tissue Mobilization were applied to the: neck and upper back for 15 minutes, including:  Cervical and thoracic mobility testing  Supine thoracic extension mobilization - grade V  Left C5 rotation mobilization - Grade V      Not today:  Cervical side glides grade III  Thoracic p/a grade III      Neuromuscular Re-education activities to improve: Proprioception, Posture, and Motor control for 8 minutes. The following activities were included:  Lower Trap - Level 1; 10 second holds x15  Chin Tucks (supine) with towel roll - 10 second hold holds x15      Therapeutic Activities to improve functional performance for 8 minutes, including:  Education (see below)  Questions and answers    Patient Education and Home Exercises     Home Exercises Provided and Patient Education Provided     Education provided:   - keep exercises in a pain free range.    Written Home Exercises Provided: yes. Exercises were reviewed and Alonzo was able to demonstrate them prior to the end of the session.  Alonzo demonstrated good  understanding of the education provided. See EMR under Patient Instructions for exercises provided during therapy sessions    ASSESSMENT     Unremarkable changes in left hand symptoms since eval. No changes following today's treatments despite cavitations and challenge / fatigue with exercises. Pt states primary reason for attending his PT is so he can get an MRI. MRI scheduled for 2/27/23. Barrier to rehab is patient's overall mindset towards physical therapy.     Alonzo Is progressing well towards his goals.   Pt prognosis is Good.     Pt will continue to benefit from skilled outpatient physical therapy to address the deficits listed in the problem list box on initial evaluation, provide pt/family education and to maximize pt's level of independence in the home and community environment. Pt's spiritual, cultural and educational needs considered and pt agreeable to plan  of care and goals.     Anticipated barriers to physical therapy: co-morbidities.    Goals:  Short Term Goals (4 Weeks):   1. Pt will be independent with HEP to supplement PT in improving pain free cervical mobility.progressing, not met  2. Pt will improve cervical rotation AROM 10 deg to improve mobility for driving. Met  3. Pt will improve UE MMTs by 1/2 grade in all planes to improve strength for overhead tasks.Met  4. Pt will report decreased numbness and tingling by 25%.progressing, not met     Long Term Goals (8 Weeks):   1. Pt will improve FOTO to </=40% limitation to improve perceived limitation with changing and maintaining mobility.progressing, not met  2. Pt will improve cervical extension AROM to 25 to improve mobility for overhead tasks. met  3. Pt will improve UE MMTs 1 grade in all planes to improve strength for lifting and carrying tasks.progressing, not met  4. Pt will report no pain with lifting 10lbs overhead. progressing, not met  5. Pt will report no pain with cervical AROM in all planes to promote unlimited functional mobility. progressing, not met    PLAN     Plan of care certification 1/30/23-3/3/23 1x/week for 4 weeks     Marilee Miller, PTA

## 2023-02-20 NOTE — PROGRESS NOTES
"[facility]   Physical Therapy Treatment Note     Name: Alonzo Turk  Clinic Number: [id number]    Therapy Diagnosis:   Encounter Diagnoses   Name Primary?    Decreased strength of upper extremity Yes    Decreased ROM of neck      Physician: Yordan Lowry MD    Visit Date: 2/20/2023    Physician Orders: PT Eval and Treat   Medical Diagnosis from Referral: M54.9 (ICD-10-CM) - Mid back pain  Evaluation Date: 12/5/2022  Authorization Period Expiration: 12/31/23  Plan of Care Expiration: 3/3/23  Visit # / Visits authorized: 5/40 + eval  FOTO: 6/10 (complete 2nd 1/30/23)  PTA Visit #: 1/5     Time In: 10:01 am  Time Out: 10:57 am  Total Billable Time: 56 minutes     Precautions: Standard    SUBJECTIVE     Pt reports: no changes since last visit. Pt reported that he is having n/t from his shoulder down his arm to the L thumb and index finger today. Pt reported that his sx have improved SOC but nothing we've done manually helps. Pt stated that he has an MRI scheduled next Monday.   He was compliant with home exercise program.  Response to previous treatment: no problems.  Functional change: not at this time.    Pain: 4/10  Location: left arm, fingers, and shoulder.      OBJECTIVE     NP    Treatment     Alonzo received the treatments listed below:      therapeutic exercises to develop strength, endurance, ROM, flexibility, and posture for 35 minutes including:  Snags rotation - x10  Serratus Punch at 120 - Blue sports cord; 3x10  Serratus wall slides: x20  Cervical retraction - x10  Radial n glides - L only 20x2" holds  No money red theraband 15x5"  Chin tucks standing with mirror feedback 20x5"  Theraband rows: 3x10 green theraband   shoulder extension green theraband : 2x10 5" green theraband  Seated thoracic extension half FR 20x      Not Today:  Median nerve glide 15x  Cervical extension with towel 10x NP  Open book pain free range 10x5" bilateral -standing  Prone shoulder extension 2x10 " "bilateral  5"  Theraband external rotation left: 2x10 red theraband  -> not today      Alonzo received the following manual therapy techniques: Joint mobilizations and Soft tissue Mobilization were applied to the: neck and upper back for 5 minutes, including:  - trial of cervical distraction -> no relief/change in sx      Not today:  Cervical side glides grade III  Thoracic p/a grade III  Cervical and thoracic mobility testing  Supine thoracic extension mobilization - grade V  Left C5 rotation mobilization - Grade V    Neuromuscular Re-education activities to improve: Proprioception, Posture, and Motor control for 8 minutes. The following activities were included:  Lower Trap - Level 1; 10 second holds x15  Chin Tucks (supine) with towel roll - 10 second hold holds x15      Therapeutic Activities to improve functional performance for 8 minutes, including:  Education (see below)  Questions and answers    Patient Education and Home Exercises     Home Exercises Provided and Patient Education Provided     Education provided:   - keep exercises in a pain free range.    Written Home Exercises Provided: yes. Exercises were reviewed and Alonzo was able to demonstrate them prior to the end of the session.  Alonzo demonstrated good  understanding of the education provided. See EMR under Patient Instructions for exercises provided during therapy sessions    ASSESSMENT     Pt w/ fair-good camille of instructed exercises today as modifications and progressions were made in tx. Pt w/ no c/o increased pain throughout tx. Pt required min-mod verbal cues throughout tx to complete exercises w/ proper technique. Pt w/ no changes or relief in radicular sx w/ trial of cervical distraction.     Alonzo Is progressing well towards his goals.   Pt prognosis is Good.     Pt will continue to benefit from skilled outpatient physical therapy to address the deficits listed in the problem list box on initial evaluation, provide pt/family education and to " maximize pt's level of independence in the home and community environment. Pt's spiritual, cultural and educational needs considered and pt agreeable to plan of care and goals.     Anticipated barriers to physical therapy: co-morbidities.    Goals:  Short Term Goals (4 Weeks):   1. Pt will be independent with HEP to supplement PT in improving pain free cervical mobility.progressing, not met  2. Pt will improve cervical rotation AROM 10 deg to improve mobility for driving. Met  3. Pt will improve UE MMTs by 1/2 grade in all planes to improve strength for overhead tasks.Met  4. Pt will report decreased numbness and tingling by 25%.progressing, not met     Long Term Goals (8 Weeks):   1. Pt will improve FOTO to </=40% limitation to improve perceived limitation with changing and maintaining mobility.progressing, not met  2. Pt will improve cervical extension AROM to 25 to improve mobility for overhead tasks. met  3. Pt will improve UE MMTs 1 grade in all planes to improve strength for lifting and carrying tasks.progressing, not met  4. Pt will report no pain with lifting 10lbs overhead. progressing, not met  5. Pt will report no pain with cervical AROM in all planes to promote unlimited functional mobility. progressing, not met    PLAN     Plan of care certification 1/30/23-3/3/23 1x/week for 4 weeks     Marlene Kelly PTA

## 2023-02-27 ENCOUNTER — HOSPITAL ENCOUNTER (OUTPATIENT)
Dept: RADIOLOGY | Facility: HOSPITAL | Age: 58
Discharge: HOME | End: 2023-02-27
Attending: ORTHOPAEDIC SURGERY
Payer: COMMERCIAL

## 2023-02-27 ENCOUNTER — OFFICE VISIT (OUTPATIENT)
Dept: ORTHOPEDICS | Facility: CLINIC | Age: 58
End: 2023-02-27
Payer: COMMERCIAL

## 2023-02-27 VITALS — BODY MASS INDEX: 23.48 KG/M2 | HEIGHT: 70 IN | WEIGHT: 164 LBS

## 2023-02-27 DIAGNOSIS — M54.9 MID BACK PAIN: ICD-10-CM

## 2023-02-27 DIAGNOSIS — M54.12 CERVICAL RADICULOPATHY: ICD-10-CM

## 2023-02-27 DIAGNOSIS — M50.30 DDD (DEGENERATIVE DISC DISEASE), CERVICAL: Primary | ICD-10-CM

## 2023-02-27 DIAGNOSIS — M47.812 CERVICAL SPONDYLOSIS: ICD-10-CM

## 2023-02-27 PROCEDURE — 72070 X-RAY EXAM THORAC SPINE 2VWS: CPT | Mod: TC

## 2023-02-27 PROCEDURE — 72146 MRI CHEST SPINE W/O DYE: CPT | Mod: TC

## 2023-02-27 PROCEDURE — 99999 PR PBB SHADOW E&M-EST. PATIENT-LVL III: ICD-10-PCS | Mod: PBBFAC,,, | Performed by: ORTHOPAEDIC SURGERY

## 2023-02-27 PROCEDURE — 3008F BODY MASS INDEX DOCD: CPT | Mod: CPTII,S$GLB,, | Performed by: ORTHOPAEDIC SURGERY

## 2023-02-27 PROCEDURE — 1159F PR MEDICATION LIST DOCUMENTED IN MEDICAL RECORD: ICD-10-PCS | Mod: CPTII,S$GLB,, | Performed by: ORTHOPAEDIC SURGERY

## 2023-02-27 PROCEDURE — 72070 XR THORACIC SPINE AP LATERAL: ICD-10-PCS | Mod: 26,,, | Performed by: RADIOLOGY

## 2023-02-27 PROCEDURE — 72070 X-RAY EXAM THORAC SPINE 2VWS: CPT | Mod: 26,,, | Performed by: RADIOLOGY

## 2023-02-27 PROCEDURE — 72146 MRI CHEST SPINE W/O DYE: CPT | Mod: 26,,, | Performed by: RADIOLOGY

## 2023-02-27 PROCEDURE — 72146 MRI THORACIC SPINE WITHOUT CONTRAST: ICD-10-PCS | Mod: 26,,, | Performed by: RADIOLOGY

## 2023-02-27 PROCEDURE — 99214 PR OFFICE/OUTPT VISIT, EST, LEVL IV, 30-39 MIN: ICD-10-PCS | Mod: S$GLB,,, | Performed by: ORTHOPAEDIC SURGERY

## 2023-02-27 PROCEDURE — 99214 OFFICE O/P EST MOD 30 MIN: CPT | Mod: S$GLB,,, | Performed by: ORTHOPAEDIC SURGERY

## 2023-02-27 PROCEDURE — 1160F RVW MEDS BY RX/DR IN RCRD: CPT | Mod: CPTII,S$GLB,, | Performed by: ORTHOPAEDIC SURGERY

## 2023-02-27 PROCEDURE — 99999 PR PBB SHADOW E&M-EST. PATIENT-LVL III: CPT | Mod: PBBFAC,,, | Performed by: ORTHOPAEDIC SURGERY

## 2023-02-27 PROCEDURE — 72070 XR THORACIC SPINE AP LATERAL  UPRIGHT: ICD-10-PCS | Mod: 26,,, | Performed by: RADIOLOGY

## 2023-02-27 PROCEDURE — 1159F MED LIST DOCD IN RCRD: CPT | Mod: CPTII,S$GLB,, | Performed by: ORTHOPAEDIC SURGERY

## 2023-02-27 PROCEDURE — 1160F PR REVIEW ALL MEDS BY PRESCRIBER/CLIN PHARMACIST DOCUMENTED: ICD-10-PCS | Mod: CPTII,S$GLB,, | Performed by: ORTHOPAEDIC SURGERY

## 2023-02-27 PROCEDURE — 3008F PR BODY MASS INDEX (BMI) DOCUMENTED: ICD-10-PCS | Mod: CPTII,S$GLB,, | Performed by: ORTHOPAEDIC SURGERY

## 2023-02-27 NOTE — PROGRESS NOTES
"DATE: 2/27/2023  PATIENT: Alonzo Turk    Attending Physician: Yordan Lowry M.D.    HISTORY:  Alonzo Turk is a 58 y.o. male who returns to me today for MRI review. Patient used to have radiating pain down LUE to the fingers, but it has been getting better. He has been taking meds with good relief.    The patient does not smoke, have DM or endorse IVDU. The patient is not on any blood thinners and does not take chronic narcotics. He works as a  for [employer].    PMH/PSH/FamHx/SocHx:  Unchanged from prior visit    ROS:  Positive for mid back and LUE pain  Denies myelopathic symptoms, perineal paresthesias, bowel or bladder incontinence    EXAM:  Ht 5' 10" (1.778 m)   Wt 74.4 kg (164 lb 0.4 oz)   BMI 23.53 kg/m²     My physical examination was notable for the following findings: motor intact BUE; SILT    IMAGING:  Today I independently reviewed the following images and my interpretations are as follows:    Previous AP and Lat upright C-spine films showed cervical spondylosis and DDD, worst at C6-7.     MRI cervical showed no significant central stenosis. L C6-7 mod foraminal stenosis.    Thoracic Xrs showed spondylosis without listhesis.    MRI thoracic showed no significant central or foraminal stenosis.    ASSESSMENT/PLAN:  Patient has cervical stenosis with LUE radiculopathy, resolving. I educated the patient on the importance of core/back strengthening, correct posture, bending/lifting ergonomics, and low-impact aerobic exercises (walking, elliptical, and aquatherapy). Continue medications and exercises. RTC PRN. Next step is C6-7 PORFIRIO with Pain Management ([place]).    Yordan Lowry MD  Orthopaedic Spine Surgeon  Department of Orthopaedic Surgery  [phone]      "

## 2023-05-15 ENCOUNTER — OFFICE VISIT (OUTPATIENT)
Dept: INTERNAL MEDICINE | Facility: CLINIC | Age: 58
End: 2023-05-15
Payer: COMMERCIAL

## 2023-05-15 ENCOUNTER — LAB VISIT (OUTPATIENT)
Dept: LAB | Facility: HOSPITAL | Age: 58
End: 2023-05-15
Attending: INTERNAL MEDICINE
Payer: COMMERCIAL

## 2023-05-15 VITALS
BODY MASS INDEX: 23.14 KG/M2 | SYSTOLIC BLOOD PRESSURE: 122 MMHG | WEIGHT: 161.63 LBS | HEIGHT: 70 IN | OXYGEN SATURATION: 97 % | DIASTOLIC BLOOD PRESSURE: 68 MMHG | HEART RATE: 73 BPM

## 2023-05-15 DIAGNOSIS — Z11.59 NEED FOR HEPATITIS C SCREENING TEST: ICD-10-CM

## 2023-05-15 DIAGNOSIS — Z12.5 SCREENING FOR PROSTATE CANCER: ICD-10-CM

## 2023-05-15 DIAGNOSIS — R73.09 ELEVATED GLUCOSE LEVEL: ICD-10-CM

## 2023-05-15 DIAGNOSIS — Z00.00 ROUTINE PHYSICAL EXAMINATION: ICD-10-CM

## 2023-05-15 DIAGNOSIS — Z12.11 COLON CANCER SCREENING: ICD-10-CM

## 2023-05-15 DIAGNOSIS — D36.9 ADENOMATOUS POLYP: ICD-10-CM

## 2023-05-15 DIAGNOSIS — Z00.00 ROUTINE PHYSICAL EXAMINATION: Primary | ICD-10-CM

## 2023-05-15 LAB
ALBUMIN SERPL BCP-MCNC: 4.2 G/DL (ref 3.5–5.2)
ALP SERPL-CCNC: 53 U/L (ref 55–135)
ALT SERPL W/O P-5'-P-CCNC: 25 U/L (ref 10–44)
ANION GAP SERPL CALC-SCNC: 7 MMOL/L (ref 8–16)
AST SERPL-CCNC: 23 U/L (ref 10–40)
BILIRUB SERPL-MCNC: 0.6 MG/DL (ref 0.1–1)
BUN SERPL-MCNC: 14 MG/DL (ref 6–20)
CALCIUM SERPL-MCNC: 9.7 MG/DL (ref 8.7–10.5)
CHLORIDE SERPL-SCNC: 102 MMOL/L (ref 95–110)
CHOLEST SERPL-MCNC: 185 MG/DL (ref 120–199)
CHOLEST/HDLC SERPL: 3.4 {RATIO} (ref 2–5)
CO2 SERPL-SCNC: 30 MMOL/L (ref 23–29)
COMPLEXED PSA SERPL-MCNC: 0.75 NG/ML (ref 0–4)
CREAT SERPL-MCNC: 0.9 MG/DL (ref 0.5–1.4)
EST. GFR  (NO RACE VARIABLE): >60 ML/MIN/1.73 M^2
ESTIMATED AVG GLUCOSE: 105 MG/DL (ref 68–131)
GLUCOSE SERPL-MCNC: 95 MG/DL (ref 70–110)
HBA1C MFR BLD: 5.3 % (ref 4–5.6)
HCV AB SERPL QL IA: NORMAL
HDLC SERPL-MCNC: 54 MG/DL (ref 40–75)
HDLC SERPL: 29.2 % (ref 20–50)
LDLC SERPL CALC-MCNC: 108.6 MG/DL (ref 63–159)
NONHDLC SERPL-MCNC: 131 MG/DL
POTASSIUM SERPL-SCNC: 4.8 MMOL/L (ref 3.5–5.1)
PROT SERPL-MCNC: 7.1 G/DL (ref 6–8.4)
SODIUM SERPL-SCNC: 139 MMOL/L (ref 136–145)
TRIGL SERPL-MCNC: 112 MG/DL (ref 30–150)

## 2023-05-15 PROCEDURE — 36415 COLL VENOUS BLD VENIPUNCTURE: CPT | Performed by: INTERNAL MEDICINE

## 2023-05-15 PROCEDURE — 3008F PR BODY MASS INDEX (BMI) DOCUMENTED: ICD-10-PCS | Mod: CPTII,S$GLB,, | Performed by: INTERNAL MEDICINE

## 2023-05-15 PROCEDURE — 3078F DIAST BP <80 MM HG: CPT | Mod: CPTII,S$GLB,, | Performed by: INTERNAL MEDICINE

## 2023-05-15 PROCEDURE — 80061 LIPID PANEL: CPT | Performed by: INTERNAL MEDICINE

## 2023-05-15 PROCEDURE — 99999 PR PBB SHADOW E&M-EST. PATIENT-LVL IV: ICD-10-PCS | Mod: PBBFAC,,, | Performed by: INTERNAL MEDICINE

## 2023-05-15 PROCEDURE — 99396 PR PREVENTIVE VISIT,EST,40-64: ICD-10-PCS | Mod: S$GLB,,, | Performed by: INTERNAL MEDICINE

## 2023-05-15 PROCEDURE — 1159F PR MEDICATION LIST DOCUMENTED IN MEDICAL RECORD: ICD-10-PCS | Mod: CPTII,S$GLB,, | Performed by: INTERNAL MEDICINE

## 2023-05-15 PROCEDURE — 86803 HEPATITIS C AB TEST: CPT | Performed by: INTERNAL MEDICINE

## 2023-05-15 PROCEDURE — 3078F PR MOST RECENT DIASTOLIC BLOOD PRESSURE < 80 MM HG: ICD-10-PCS | Mod: CPTII,S$GLB,, | Performed by: INTERNAL MEDICINE

## 2023-05-15 PROCEDURE — 3074F PR MOST RECENT SYSTOLIC BLOOD PRESSURE < 130 MM HG: ICD-10-PCS | Mod: CPTII,S$GLB,, | Performed by: INTERNAL MEDICINE

## 2023-05-15 PROCEDURE — 99396 PREV VISIT EST AGE 40-64: CPT | Mod: S$GLB,,, | Performed by: INTERNAL MEDICINE

## 2023-05-15 PROCEDURE — 3074F SYST BP LT 130 MM HG: CPT | Mod: CPTII,S$GLB,, | Performed by: INTERNAL MEDICINE

## 2023-05-15 PROCEDURE — 80053 COMPREHEN METABOLIC PANEL: CPT | Performed by: INTERNAL MEDICINE

## 2023-05-15 PROCEDURE — 84153 ASSAY OF PSA TOTAL: CPT | Performed by: INTERNAL MEDICINE

## 2023-05-15 PROCEDURE — 99999 PR PBB SHADOW E&M-EST. PATIENT-LVL IV: CPT | Mod: PBBFAC,,, | Performed by: INTERNAL MEDICINE

## 2023-05-15 PROCEDURE — 83036 HEMOGLOBIN GLYCOSYLATED A1C: CPT | Performed by: INTERNAL MEDICINE

## 2023-05-15 PROCEDURE — 3008F BODY MASS INDEX DOCD: CPT | Mod: CPTII,S$GLB,, | Performed by: INTERNAL MEDICINE

## 2023-05-15 PROCEDURE — 1159F MED LIST DOCD IN RCRD: CPT | Mod: CPTII,S$GLB,, | Performed by: INTERNAL MEDICINE

## 2023-05-15 RX ORDER — ATORVASTATIN CALCIUM 20 MG/1
20 TABLET, FILM COATED ORAL DAILY
Qty: 90 TABLET | Refills: 3 | Status: SHIPPED | OUTPATIENT
Start: 2023-05-15

## 2023-05-15 NOTE — PROGRESS NOTES
Subjective:       Patient ID: Alonzo Turk is a 58 y.o. male.    Chief Complaint: Annual Exam and Medication Refill (/)    For annual exam and refill of atorvastatin.  Feels well,  he is having no complaints.  He denies any GI or  complaints.  We reviewing screening activities and he actually is due for a colonoscopy at the end of this year.  Prior gastro retired 5 years ago he had a scope.  Remembered being told 10 years but when I reviewed those outside records it said 10 years, sooner depending on results.  The 1 polyp was not adenomatous polyp and up note said repeat 5 years which will be at the end of this year.  He would like to update some blood work.    Medication Refill  Pertinent negatives include no abdominal pain, arthralgias, chest pain, chills, coughing, fatigue, fever, headaches, nausea, neck pain, rash or vomiting.   Review of Systems   Constitutional:  Negative for chills, fatigue and fever.   HENT:  Negative for nosebleeds and trouble swallowing.    Eyes:  Negative for pain and visual disturbance.   Respiratory:  Negative for cough, shortness of breath and wheezing.    Cardiovascular:  Negative for chest pain and palpitations.   Gastrointestinal:  Negative for abdominal pain, constipation, diarrhea, nausea and vomiting.   Genitourinary:  Negative for difficulty urinating and hematuria.   Musculoskeletal:  Negative for arthralgias, back pain and neck pain.   Integumentary:  Negative for rash.   Neurological:  Negative for dizziness and headaches.   Hematological:  Does not bruise/bleed easily.   Psychiatric/Behavioral:  Negative for dysphoric mood and sleep disturbance.          Past Medical History:   Diagnosis Date    Gastric ulcer     Hyperlipidemia      Past Surgical History:   Procedure Laterality Date    ESOPHAGOGASTRODUODENOSCOPY N/A 1/4/2019    Procedure: ESOPHAGOGASTRODUODENOSCOPY (EGD);  Surgeon: Kodak Freeman MD;  Location: [address]);  Service: Endoscopy;  Laterality: N/A;  6 months  ago pt had outside EGD with multiple ulcers in the stomach. Has been on PPI 6 months. GI rec repeat scope to document healing. Pt was asymptomatic at time .      Patient Active Problem List   Diagnosis    Multiple gastric ulcers    Dyslipidemia    Left arm pain    History of gastric ulcer    DDD (degenerative disc disease), cervical    Lateral epicondylitis of left elbow    Skin lump of arm, left    Left elbow pain    Decreased strength of upper extremity    Decreased ROM of neck        Objective:      Physical Exam  Constitutional:       General: He is not in acute distress.     Appearance: He is well-developed.   HENT:      Head: Normocephalic and atraumatic.      Right Ear: Tympanic membrane, ear canal and external ear normal.      Left Ear: Tympanic membrane, ear canal and external ear normal.      Mouth/Throat:      Pharynx: No oropharyngeal exudate or posterior oropharyngeal erythema.   Eyes:      General: No scleral icterus.     Conjunctiva/sclera: Conjunctivae normal.      Pupils: Pupils are equal, round, and reactive to light.   Neck:      Thyroid: No thyromegaly.      Comments: No supraclavicular nodes palpated  Cardiovascular:      Rate and Rhythm: Normal rate and regular rhythm.      Pulses: Normal pulses.      Heart sounds: Normal heart sounds. No murmur heard.  Pulmonary:      Effort: Pulmonary effort is normal.      Breath sounds: Normal breath sounds. No wheezing.   Abdominal:      General: Bowel sounds are normal.      Palpations: Abdomen is soft. There is no mass.      Tenderness: There is no abdominal tenderness.   Genitourinary:     Comments: Pt declines    Musculoskeletal:         General: No tenderness.      Cervical back: Normal range of motion and neck supple.      Right lower leg: No edema.      Left lower leg: No edema.   Lymphadenopathy:      Cervical: No cervical adenopathy.   Skin:     Coloration: Skin is not jaundiced or pale.   Neurological:      General: No focal deficit present.       "Mental Status: He is alert and oriented to person, place, and time.   Psychiatric:         Mood and Affect: Mood normal.         Behavior: Behavior normal.       Assessment:       Problem List Items Addressed This Visit    None  Visit Diagnoses       Routine physical examination    -  Primary    Relevant Orders    Lipid Panel    PSA, Screening    Comprehensive Metabolic Panel    Hemoglobin A1C    Screening for prostate cancer        Relevant Orders    PSA, Screening    Elevated glucose level        Relevant Orders    Hemoglobin A1C    Colon cancer screening        Relevant Orders    Ambulatory referral/consult to Endo Procedure     Adenomatous polyp        Relevant Orders    Ambulatory referral/consult to Endo Procedure     Need for hepatitis C screening test        Relevant Orders    Hepatitis C Antibody            Plan:         Alonzo was seen today for annual exam and medication refill.    Diagnoses and all orders for this visit:    Routine physical examination  -     Lipid Panel; Future  -     PSA, Screening; Future  -     Comprehensive Metabolic Panel; Future  -     Hemoglobin A1C; Future    Screening for prostate cancer  -     PSA, Screening; Future    Elevated glucose level  -     Hemoglobin A1C; Future    Colon cancer screening  -     Ambulatory referral/consult to Endo Procedure ; Future    Adenomatous polyp  -     Ambulatory referral/consult to Endo Procedure ; Future    Need for hepatitis C screening test  -     Hepatitis C Antibody; Future    Other orders  -     atorvastatin (LIPITOR) 20 MG tablet; Take 1 tablet (20 mg total) by mouth once daily.           Review labs. Assist with C-scope.  Follow up 1 year.           Portions of this note may have been created with voice recognition software. Occasional "wrong-word" or "sound-a-like" substitutions may have occurred due to the inherent limitations of voice recognition software. Please, read the note carefully and " recognize, using context, where substitutions have occurred.

## 2023-05-18 ENCOUNTER — PATIENT MESSAGE (OUTPATIENT)
Dept: ENDOSCOPY | Facility: HOSPITAL | Age: 58
End: 2023-05-18
Payer: COMMERCIAL

## 2023-05-18 ENCOUNTER — TELEPHONE (OUTPATIENT)
Dept: ENDOSCOPY | Facility: HOSPITAL | Age: 58
End: 2023-05-18
Payer: COMMERCIAL

## 2023-05-18 NOTE — TELEPHONE ENCOUNTER
Called and left detailed message that Appointment with Dr Ambrocio for 5/22 needs to be reschedule. Also sent portal message.

## 2023-06-09 ENCOUNTER — PATIENT MESSAGE (OUTPATIENT)
Dept: RESEARCH | Facility: HOSPITAL | Age: 58
End: 2023-06-09
Payer: COMMERCIAL

## 2023-06-13 ENCOUNTER — OFFICE VISIT (OUTPATIENT)
Dept: GASTROENTEROLOGY | Facility: CLINIC | Age: 58
End: 2023-06-13
Payer: COMMERCIAL

## 2023-06-13 VITALS — BODY MASS INDEX: 23.1 KG/M2 | WEIGHT: 161.38 LBS | HEIGHT: 70 IN

## 2023-06-13 DIAGNOSIS — K63.5 POLYP OF SIGMOID COLON, UNSPECIFIED TYPE: Primary | ICD-10-CM

## 2023-06-13 PROCEDURE — 99204 OFFICE O/P NEW MOD 45 MIN: CPT | Mod: S$GLB,,, | Performed by: INTERNAL MEDICINE

## 2023-06-13 PROCEDURE — 3008F BODY MASS INDEX DOCD: CPT | Mod: CPTII,S$GLB,, | Performed by: INTERNAL MEDICINE

## 2023-06-13 PROCEDURE — 99204 PR OFFICE/OUTPT VISIT, NEW, LEVL IV, 45-59 MIN: ICD-10-PCS | Mod: S$GLB,,, | Performed by: INTERNAL MEDICINE

## 2023-06-13 PROCEDURE — 3008F PR BODY MASS INDEX (BMI) DOCUMENTED: ICD-10-PCS | Mod: CPTII,S$GLB,, | Performed by: INTERNAL MEDICINE

## 2023-06-13 PROCEDURE — 1159F MED LIST DOCD IN RCRD: CPT | Mod: CPTII,S$GLB,, | Performed by: INTERNAL MEDICINE

## 2023-06-13 PROCEDURE — 3044F PR MOST RECENT HEMOGLOBIN A1C LEVEL <7.0%: ICD-10-PCS | Mod: CPTII,S$GLB,, | Performed by: INTERNAL MEDICINE

## 2023-06-13 PROCEDURE — 3044F HG A1C LEVEL LT 7.0%: CPT | Mod: CPTII,S$GLB,, | Performed by: INTERNAL MEDICINE

## 2023-06-13 PROCEDURE — 99999 PR PBB SHADOW E&M-EST. PATIENT-LVL III: CPT | Mod: PBBFAC,,, | Performed by: INTERNAL MEDICINE

## 2023-06-13 PROCEDURE — 99999 PR PBB SHADOW E&M-EST. PATIENT-LVL III: ICD-10-PCS | Mod: PBBFAC,,, | Performed by: INTERNAL MEDICINE

## 2023-06-13 PROCEDURE — 1159F PR MEDICATION LIST DOCUMENTED IN MEDICAL RECORD: ICD-10-PCS | Mod: CPTII,S$GLB,, | Performed by: INTERNAL MEDICINE

## 2023-06-13 RX ORDER — SOD SULF/POT CHLORIDE/MAG SULF 1.479 G
12 TABLET ORAL DAILY
Qty: 24 TABLET | Refills: 0 | Status: SHIPPED | OUTPATIENT
Start: 2023-06-13

## 2023-06-13 NOTE — PROGRESS NOTES
Subjective:       Patient ID: Alonzo Turk is a 58 y.o. male.    Chief Complaint: Colonoscopy    Patient here today to establish care with aforementioned complaints.  Patient had screening colonoscopy performed in 2018 which was significant for a 5 mm polyp removed from the sigmoid colon.  A 5 year recall interval recommended.  Today patient reports doing well.  Denies GI issues.  No complaints.    Outside records reviewed.  EGD 1/2019 - Normal  EGD 5/2018 - ulceration/ nodularity  in antrum, bipsied. Ulcers in the duod bulb/second portion   Colon 5/2018 - 5 mm polyp in sigmoid. IH    Past Medical History:   Diagnosis Date    Gastric ulcer     Hyperlipidemia        Past Surgical History:   Procedure Laterality Date    ESOPHAGOGASTRODUODENOSCOPY N/A 1/4/2019    Procedure: ESOPHAGOGASTRODUODENOSCOPY (EGD);  Surgeon: Kodak Freeman MD;  Location: [address]);  Service: Endoscopy;  Laterality: N/A;  6 months ago pt had outside EGD with multiple ulcers in the stomach. Has been on PPI 6 months. GI rec repeat scope to document healing. Pt was asymptomatic at time .       Social History  Social History     Tobacco Use    Smoking status: Never    Smokeless tobacco: Never   Substance Use Topics    Alcohol use: No       Family History   Problem Relation Age of Onset    Diabetes Neg Hx     Heart disease Neg Hx        Review of Systems   Gastrointestinal:  Negative for abdominal pain, blood in stool, constipation and diarrhea.         Objective:      Physical Exam  Constitutional:       Appearance: He is well-developed.   HENT:      Head: Normocephalic and atraumatic.   Eyes:      Conjunctiva/sclera: Conjunctivae normal.   Pulmonary:      Effort: Pulmonary effort is normal. No respiratory distress.   Neurological:      Mental Status: He is alert and oriented to person, place, and time.   Psychiatric:         Behavior: Behavior normal.         Thought Content: Thought content normal.         Judgment: Judgment normal.          Pertinent labs and imaging studies reviewed    Assessment:       1. Polyp of sigmoid colon, unspecified type        Plan:       Schedule surveillance colonoscopy    (Portions of this note were dictated using voice recognition software and may contain dictation related errors in spelling/grammar/syntax not found on text review)

## 2023-06-13 NOTE — PATIENT INSTRUCTIONS
SUTAB Instructions    [facility] [address]  Clinic Office [phone]  Endoscopy Lab [phone]    You are scheduled for a Colonoscopy with Dr. Cifuentes on 07/17 /2023 at [facility] in [place].    Check in at the Hospital -1st floor, Information desk. A covid test will be required 3 days before your procedure.  Call [phone] to reschedule.    An adult friend/family member must come with you to drive you home.  You cannot drive, take a taxi, Uber/Lyft or bus to leave the Endoscopy Center alone.  If you do not have someone to drive you home, your test will be cancelled.     Please follow the directions of your doctor if you take any pills that thin your blood. If you take these meds: Aggrenox, Brilinta, Effient, Eliquis, Lovenox, Plavix, Pletal, Pradaxa, Ticilid, Xarelto or Coumadin, let the doctor's office know.    DON'T: On the morning of the test do not take insulin or pills for diabetes.     DO: On the morning of the test, do take any pills for blood pressure, heart, anti-rejection and or seizures with a small sip of water. Bring any inhalers with you.    To have a good prep, you must follow these instructions - please do not use the directions from the pharmacy.    The doctor will send a prescription for the SUTAB.    The Day Before the test:    You can only drink CLEAR LIQUIDS the whole day before your test.  You can't eat any food for the whole day.    You CAN have:  Water, Coffee or decaf coffee (no milk or cream)  Tea  Soft drinks - regular and sugar free  Jell-O (green or yellow)  Apple Juice, grape juice, white cranberry juice  Gatorade, Power Aid, Crystal Light, Arjun Aid  Lemonade and Limeade  Bouillon, clear soup  Snowball, popsicles  YOU CAN'T DRINK ANYTHING RED, PURPLE ORANGE OR BLUE   YOU CAN'T DRINK ALCOHOL  ONLY DRINK WHAT IS ON THE LIST      At 5 pm the night before your test:    Open the bottle of 12 tablets. Fill the provided cup with water up to the  line = 16 oz. Swallow each pill with water. Drink the rest of the water in the cup in 20 minutes.    At 6 pm:  Fill the cup with water up to the line = 16 oz. Drink the cup of water in 30 minutes.    At 7 pm:  Fill the cup with water up to the line = 16 oz. Drink the cup of water in 30 minutes.     Continue to drink water or clear liquids until you go to sleep.      The Day of the test - We will call you 2 days before your test to tell you what time to get to the hospital. We will also tell you when to do the next steps.    5 hours before you come to the hospital (this may be in the middle of the night): ___________________= time  Open the bottle of 12 tablets. Fill the cup with water up to the line = 16 oz. Swallow each pill with water. Drink the rest of the water in the cup in 20 minutes.    At 4 hours before you come to the hospital: ______________= time  Fill the cup with water up to the line = 16 oz. Drink the cup of water in 30 minutes.     At 3 hours before you come to the hospital: ______________= time    YOU CAN'T EAT OR DRINK ANYTHING ELSE ONCE YOU FINISH THE WATER AT _____________ = TIME    Leave all valuables and jewelry at home. You will be at the hospital for 2-4 hours.    Call the Endoscopy department at [phone] with any questions about your procedure.          Please give this Coupon Code to your pharmacist.    BIN: [id number]  NIDIAN: [id number]  GROUP: [id number]  MEMBER ID: [id number]

## 2023-06-27 ENCOUNTER — PATIENT MESSAGE (OUTPATIENT)
Dept: ENDOSCOPY | Facility: HOSPITAL | Age: 58
End: 2023-06-27
Payer: COMMERCIAL

## 2023-06-27 ENCOUNTER — TELEPHONE (OUTPATIENT)
Dept: GASTROENTEROLOGY | Facility: CLINIC | Age: 58
End: 2023-06-27
Payer: COMMERCIAL

## 2023-06-27 NOTE — TELEPHONE ENCOUNTER
----- Message from Rabia Luis sent at 6/27/2023  3:33 PM CDT -----  Type:  Needs Medical Advice    Who Called: pt  Would the patient rather a call back or a response via [facility]? call  Best Call Back Number: [phone] or [phone] Drake  Additional Information: patient requesting a call back to get an appointment he's not contracted with [facility].

## 2023-08-25 ENCOUNTER — TELEPHONE (OUTPATIENT)
Dept: GASTROENTEROLOGY | Facility: CLINIC | Age: 58
End: 2023-08-25
Payer: COMMERCIAL

## 2023-08-25 NOTE — TELEPHONE ENCOUNTER
----- Message from Seema Crowe sent at 8/25/2023  1:56 PM CDT -----  Regarding: Patient advice  Contact: Pt  Pt returning call from office       Pt can be reached at [phone]

## 2023-12-04 ENCOUNTER — PATIENT MESSAGE (OUTPATIENT)
Dept: INTERNAL MEDICINE | Facility: CLINIC | Age: 58
End: 2023-12-04
Payer: COMMERCIAL

## 2023-12-06 ENCOUNTER — PATIENT MESSAGE (OUTPATIENT)
Dept: INTERNAL MEDICINE | Facility: CLINIC | Age: 58
End: 2023-12-06
Payer: COMMERCIAL

## 2024-02-23 ENCOUNTER — OFFICE VISIT (OUTPATIENT)
Dept: URGENT CARE | Facility: CLINIC | Age: 59
End: 2024-02-23
Payer: COMMERCIAL

## 2024-02-23 VITALS
RESPIRATION RATE: 17 BRPM | SYSTOLIC BLOOD PRESSURE: 119 MMHG | HEART RATE: 77 BPM | DIASTOLIC BLOOD PRESSURE: 78 MMHG | WEIGHT: 161 LBS | TEMPERATURE: 98 F | HEIGHT: 70 IN | BODY MASS INDEX: 23.05 KG/M2 | OXYGEN SATURATION: 96 %

## 2024-02-23 DIAGNOSIS — M54.9 BACK PAIN, UNSPECIFIED BACK LOCATION, UNSPECIFIED BACK PAIN LATERALITY, UNSPECIFIED CHRONICITY: ICD-10-CM

## 2024-02-23 DIAGNOSIS — M62.830 LUMBAR PARASPINAL MUSCLE SPASM: Primary | ICD-10-CM

## 2024-02-23 PROCEDURE — 99213 OFFICE O/P EST LOW 20 MIN: CPT | Mod: 25,S$GLB,, | Performed by: FAMILY MEDICINE

## 2024-02-23 PROCEDURE — 96372 THER/PROPH/DIAG INJ SC/IM: CPT | Mod: S$GLB,,, | Performed by: FAMILY MEDICINE

## 2024-02-23 RX ORDER — PREDNISONE 20 MG/1
40 TABLET ORAL DAILY
Qty: 10 TABLET | Refills: 0 | Status: SHIPPED | OUTPATIENT
Start: 2024-02-23 | End: 2024-02-28

## 2024-02-23 RX ORDER — KETOROLAC TROMETHAMINE 30 MG/ML
30 INJECTION, SOLUTION INTRAMUSCULAR; INTRAVENOUS
Status: COMPLETED | OUTPATIENT
Start: 2024-02-23 | End: 2024-02-23

## 2024-02-23 RX ORDER — CYCLOBENZAPRINE HCL 10 MG
10 TABLET ORAL NIGHTLY
Qty: 10 TABLET | Refills: 0 | Status: SHIPPED | OUTPATIENT
Start: 2024-02-23 | End: 2024-03-04

## 2024-02-23 RX ADMIN — KETOROLAC TROMETHAMINE 30 MG: 30 INJECTION, SOLUTION INTRAMUSCULAR; INTRAVENOUS at 11:02

## 2024-02-23 NOTE — PROGRESS NOTES
"Subjective:      Patient ID: Alonzo Turk is a 59 y.o. male.    Vitals:  height is 5' 10" (1.778 m) and weight is 73 kg (161 lb). His oral temperature is 97.6 °F (36.4 °C). His blood pressure is 119/78 and his pulse is 77. His respiration is 17 and oxygen saturation is 96%.     Chief Complaint: Back Pain    59 yr old male came in with complaints of lower back pain. He was cleaning bending down when he got up his back started to hurt. His symptoms started Monday.    Back Pain  This is a new problem. The current episode started in the past 7 days. The problem occurs constantly. The problem has been gradually worsening since onset. The quality of the pain is described as aching and cramping. The pain is at a severity of 5/10. He has tried nothing for the symptoms.     Musculoskeletal:  Positive for back pain.      Objective:     Physical Exam   Constitutional: He is oriented to person, place, and time. He appears well-developed. He is cooperative. No distress.   HENT:   Head: Normocephalic and atraumatic.   Nose: Nose normal.   Mouth/Throat: Oropharynx is clear and moist and mucous membranes are normal.   Eyes: Conjunctivae and lids are normal.   Neck: Trachea normal and phonation normal. Neck supple.   Cardiovascular: Normal rate, regular rhythm, normal heart sounds and normal pulses.   Pulmonary/Chest: Effort normal and breath sounds normal.   Abdominal: Normal appearance and bowel sounds are normal. He exhibits no mass. Soft.   Musculoskeletal:         General: No deformity.      Lumbar back: He exhibits decreased range of motion and tenderness. He exhibits no swelling.   Neurological: He is alert and oriented to person, place, and time. He has normal strength and normal reflexes. No sensory deficit.   Skin: Skin is warm, dry, intact and not diaphoretic.   Psychiatric: His speech is normal and behavior is normal. Judgment and thought content normal.   Nursing note and vitals reviewed.      Assessment:     1. Lumbar " paraspinal muscle spasm    2. Back pain, unspecified back location, unspecified back pain laterality, unspecified chronicity        Plan:       Lumbar paraspinal muscle spasm  -     cyclobenzaprine (FLEXERIL) 10 MG tablet; Take 1 tablet (10 mg total) by mouth every evening. for 10 days  Dispense: 10 tablet; Refill: 0  -     ketorolac injection 30 mg    Back pain, unspecified back location, unspecified back pain laterality, unspecified chronicity  -     Cancel: POCT Urinalysis, Dipstick, Automated, W/O Scope  -     predniSONE (DELTASONE) 20 MG tablet; Take 2 tablets (40 mg total) by mouth once daily. for 5 days  Dispense: 10 tablet; Refill: 0    Thank you for choosing [facility]!     Our goal in the Urgent Care is to always provide outstanding medical care. You may receive a survey by mail or e-mail in the next week regarding your experience today. We would greatly appreciate you completing and returning the survey. Your feedback provides us with a way to recognize our staff who provide very good care, and it helps us learn how to improve when your experience was below our aspiration of excellence.       We appreciate you trusting us with your medical care. We hope you feel better soon. We will be happy to take care of you for all of your future medical needs.  You must understand that you've received an Urgent Care treatment only and that you may be released before all your medical problems are known or treated. You, the patient, will arrange for follow up care as instructed.  Follow up with your PCP or specialty clinic as directed in the next 1-2 weeks if not improved or as needed.  You can call [phone] to schedule an appointment with the appropriate provider.  Another option is to follow up with [facility] ([email or web address]) virtually for quick simple medical advice.  If your condition worsens we recommend that you receive another evaluation at  the emergency room immediately or contact your primary medical clinics after hours call service to discuss your concerns.  Please return here or go to the Emergency Department for any concerns or worsening of condition.      *If you were prescribed a narcotic or controlled medication, do not drive or operate heavy equipment or machinery while taking these medications.

## 2024-04-26 ENCOUNTER — OFFICE VISIT (OUTPATIENT)
Dept: URGENT CARE | Facility: CLINIC | Age: 59
End: 2024-04-26
Payer: COMMERCIAL

## 2024-04-26 VITALS
WEIGHT: 161 LBS | HEART RATE: 85 BPM | SYSTOLIC BLOOD PRESSURE: 121 MMHG | OXYGEN SATURATION: 95 % | RESPIRATION RATE: 20 BRPM | TEMPERATURE: 98 F | HEIGHT: 70 IN | DIASTOLIC BLOOD PRESSURE: 83 MMHG | BODY MASS INDEX: 23.05 KG/M2

## 2024-04-26 DIAGNOSIS — S39.012A STRAIN OF LUMBAR PARASPINOUS MUSCLE, INITIAL ENCOUNTER: Primary | ICD-10-CM

## 2024-04-26 PROCEDURE — 96372 THER/PROPH/DIAG INJ SC/IM: CPT | Mod: S$GLB,,, | Performed by: NURSE PRACTITIONER

## 2024-04-26 PROCEDURE — 99213 OFFICE O/P EST LOW 20 MIN: CPT | Mod: 25,S$GLB,, | Performed by: NURSE PRACTITIONER

## 2024-04-26 RX ORDER — KETOROLAC TROMETHAMINE 30 MG/ML
30 INJECTION, SOLUTION INTRAMUSCULAR; INTRAVENOUS
Status: COMPLETED | OUTPATIENT
Start: 2024-04-26 | End: 2024-04-26

## 2024-04-26 RX ORDER — CYCLOBENZAPRINE HCL 10 MG
10 TABLET ORAL NIGHTLY PRN
Qty: 10 TABLET | Refills: 0 | Status: SHIPPED | OUTPATIENT
Start: 2024-04-26 | End: 2024-06-03

## 2024-04-26 RX ORDER — METHYLPREDNISOLONE 4 MG/1
TABLET ORAL
Qty: 21 EACH | Refills: 0 | Status: SHIPPED | OUTPATIENT
Start: 2024-04-26 | End: 2024-05-17

## 2024-04-26 RX ADMIN — KETOROLAC TROMETHAMINE 30 MG: 30 INJECTION, SOLUTION INTRAMUSCULAR; INTRAVENOUS at 12:04

## 2024-04-26 NOTE — PROGRESS NOTES
"Subjective:      Patient ID: Alonzo Turk is a 59 y.o. male.    Vitals:  height is 5' 10" (1.778 m) and weight is 73 kg (161 lb). His oral temperature is 98.1 °F (36.7 °C). His blood pressure is 121/83 and his pulse is 85. His respiration is 20 and oxygen saturation is 95%.     Chief Complaint: Back Pain    Pt present with back pain. Pt stated he was cleaning his car last week and feels like he may have pulled a muscle in his back.   Provider note below:  This is a 59 y.o. male who presents today with a chief complaint of pain after cleaning his car last week and probably pulled his muscle, denies any trauma injury, denies numbness or tingling, denies radiating pain, denies fever, body aches or chills, denies cough, wheezing or shortness of breath, denies nausea, vomiting, diarrhea or abdominal pain, denies chest pain or dizziness positional lightheadedness, denies sore throat or trouble swallowing, denies loss of taste or smell, or any other symptoms       Back Pain  This is a new problem. The current episode started in the past 7 days. The problem occurs constantly. The problem has been gradually worsening since onset. The quality of the pain is described as aching. The pain does not radiate. The pain is at a severity of 5/10. The pain is moderate. The pain is The same all the time. The symptoms are aggravated by bending, position and standing. Stiffness is present All day. Pertinent negatives include no headaches, numbness or tingling. He has tried NSAIDs for the symptoms. The treatment provided no relief.       Musculoskeletal:  Positive for back pain.   Neurological:  Negative for headaches and numbness.      Objective:     Physical Exam   Constitutional: He is oriented to person, place, and time. He appears well-developed. He is cooperative. No distress.   HENT:   Head: Normocephalic and atraumatic.   Nose: Nose normal.   Mouth/Throat: Oropharynx is clear and moist and mucous membranes are normal.   Eyes: " Conjunctivae and lids are normal.   Neck: Trachea normal and phonation normal. Neck supple.   Cardiovascular: Normal rate, regular rhythm, normal heart sounds and normal pulses.   Pulmonary/Chest: Effort normal and breath sounds normal.   Abdominal: Normal appearance and bowel sounds are normal. He exhibits no mass. Soft.   Musculoskeletal:         General: No deformity.      Lumbar back: He exhibits tenderness and spasm.      Comments: No midline spine tenderness.  Full range of motion bilaterally with 5/5 strength  Able to ambulate with smooth rhythmic gait        Neurological: He is alert and oriented to person, place, and time. He has normal strength and normal reflexes. No sensory deficit.   Skin: Skin is warm, dry, intact and not diaphoretic.   Psychiatric: His speech is normal and behavior is normal. Judgment and thought content normal.   Nursing note and vitals reviewed.        Patient in no acute distress.  Vitals reassuring.  Discussed results/diagnosis/plan in depth with patient in clinic. Strict precautions given to patient to monitor for worsening signs and symptoms. Advised to follow up with primary.All questions answered. Strict ER precautions given. If your symptoms worsens or fail to improve you should go to the Emergency Room. Discharge and follow-up instructions given verbally/printed. Discharge and follow-up instructions discussed with the patient who expressed understanding and willingness to comply with my recommendations.Patient voiced understanding and in agreement with current treatment plan.     Please be advised this text was dictated with [email or web address] ([email or web address]) software and may contain errors due to translation.   Assessment:     1. Strain of lumbar paraspinous muscle, initial encounter        Plan:       Strain of lumbar paraspinous muscle, initial encounter    Other orders  -     ketorolac injection 30 mg  -     methylPREDNISolone (MEDROL DOSEPACK) 4 mg tablet; use as directed  Dispense: 21 each;  Refill: 0  -     cyclobenzaprine (FLEXERIL) 10 MG tablet; Take 1 tablet (10 mg total) by mouth nightly as needed for Muscle spasms.  Dispense: 10 tablet; Refill: 0          Medical Decision Making:   Urgent Care Management:  Patient in no acute distress.  Vitals reassuring.  On exam, patient is nontoxic appearing and afebrile.  Lungs CTA.  Physical examination as above.  Toradol injection given in clinic with detailed education provided about side effects.  Will prescribe Medrol Dosepak with detailed education provided about side effects recommendation of steroids.  Medication prescribed and over-the-counter medication discussed with patient at length.  Proper hydration advised.  I reiterated the importance of further evaluation if no improvement symptoms and follow-up with primary. Patient voiced understanding and in agreement with current treatment plan.               Patient Instructions   PLEASE READ YOUR DISCHARGE INSTRUCTIONS ENTIRELY AS IT CONTAINS IMPORTANT INFORMATION.      Please drink plenty of fluids.  Please get plenty of rest.  Ice to the area.   You may do gently stretching if tolerable.    DO NOT TAKE ANY MORE NAPROXEN OR IBUPROFEN FOR 24 HOURS AS YOU RECEIVED A LARGE DOSE OF IT VIA INJECTION     Please return here or go to the Emergency Department for any concerns or worsening of condition.    If you were prescribed a narcotic medication or muscle relaxer (Felexril), do not drive or operate heavy equipment or machinery while taking these medications. Take a half first to see how it affects you    Take the Ibuprofen with food. Also take an over the counter antacid with it (prilosec, Nexium, Pepcid) to protect your stomach.     If you were not prescribed an anti-inflammatory medication, and if you do not have any history of stomach/intestinal ulcers, or kidney disease, or are not taking a blood thinner such as Coumadin, Plavix, Pradaxa, Eloquis, or Xaralta for example, it is OK to take over the  counter Ibuprofen or Advil or Motrin or Aleve as directed.  Do not take these medications on an empty stomach.    If you lose control of your bowel and/or bladder, please go to the nearest Emergency Department immediately.    If you lose sensation in between your legs by your genitalia and/or rectum, please go to the nearest Emergency Department immediately.    If you lose control or sensation of any extremity, please go to the nearest Emergency Department immediately.    Do not stay in one position to long.  When sleeping on your back place a pillow under knees to reduce tension on back.  If sleeping on your side, place pillow between knees to keep spine in better alinement.  Wear supportive shoes such as tennis shoes for support of the lower back.  Take any medication as directed.    Please follow up with your primary care doctor or specialist as needed.    If you  smoke, please stop smoking.      Please arrange follow up with your primary medical clinic as soon as possible. You must understand that you've received an Urgent Care treatment only and that you may be released before all of your medical problems are known or treated. You, the patient, will arrange for follow up as instructed. If your symptoms worsen or fail to improve you should go to the Emergency Room.

## 2024-04-26 NOTE — PATIENT INSTRUCTIONS
PLEASE READ YOUR DISCHARGE INSTRUCTIONS ENTIRELY AS IT CONTAINS IMPORTANT INFORMATION.      Please drink plenty of fluids.  Please get plenty of rest.  Ice to the area.   You may do gently stretching if tolerable.    DO NOT TAKE ANY MORE NAPROXEN OR IBUPROFEN FOR 24 HOURS AS YOU RECEIVED A LARGE DOSE OF IT VIA INJECTION     Please return here or go to the Emergency Department for any concerns or worsening of condition.    If you were prescribed a narcotic medication or muscle relaxer (Felexril), do not drive or operate heavy equipment or machinery while taking these medications. Take a half first to see how it affects you    Take the Ibuprofen with food. Also take an over the counter antacid with it (prilosec, Nexium, Pepcid) to protect your stomach.     If you were not prescribed an anti-inflammatory medication, and if you do not have any history of stomach/intestinal ulcers, or kidney disease, or are not taking a blood thinner such as Coumadin, Plavix, Pradaxa, Eloquis, or Xaralta for example, it is OK to take over the counter Ibuprofen or Advil or Motrin or Aleve as directed.  Do not take these medications on an empty stomach.    If you lose control of your bowel and/or bladder, please go to the nearest Emergency Department immediately.    If you lose sensation in between your legs by your genitalia and/or rectum, please go to the nearest Emergency Department immediately.    If you lose control or sensation of any extremity, please go to the nearest Emergency Department immediately.    Do not stay in one position to long.  When sleeping on your back place a pillow under knees to reduce tension on back.  If sleeping on your side, place pillow between knees to keep spine in better alinement.  Wear supportive shoes such as tennis shoes for support of the lower back.  Take any medication as directed.    Please follow up with your primary care doctor or specialist as needed.    If you  smoke, please stop  smoking.      Please arrange follow up with your primary medical clinic as soon as possible. You must understand that you've received an Urgent Care treatment only and that you may be released before all of your medical problems are known or treated. You, the patient, will arrange for follow up as instructed. If your symptoms worsen or fail to improve you should go to the Emergency Room.

## 2024-04-30 RX ORDER — ATORVASTATIN CALCIUM 20 MG/1
20 TABLET, FILM COATED ORAL
Qty: 90 TABLET | Refills: 0 | Status: SHIPPED | OUTPATIENT
Start: 2024-04-30 | End: 2024-06-03 | Stop reason: SDUPTHER

## 2024-04-30 NOTE — TELEPHONE ENCOUNTER
Provider Staff:  Action required for this patient     Please see care gap opportunities below in Care Due Message:   CMP & lipid panel due 5/9/24    Thanks!  [facility]     Appointments      Date Provider   Last Visit   5/15/2023 Benji Hudson MD   Next Visit   6/3/2024 Benji Hudson MD     Refill Decision Note   Alonzo Turk  is requesting a refill authorization.  Brief Assessment and Rationale for Refill:  Approve     Medication Therapy Plan:         Comments:     Note composed:6:13 PM 04/30/2024

## 2024-04-30 NOTE — TELEPHONE ENCOUNTER
Care Due:                  Date            Visit Type   Department     Provider  --------------------------------------------------------------------------------                                MYCHART                              ANNUAL                              CHECKUP/PHY  [facility] INTERNAL  Last Visit: 05-      S            GIULIA HERNANDEZ                              FOLLOWUP/OF  [facility] INTERNAL  Next Visit: 06-      FICE VISIT   MEDICINE       Benji Hudson                                                            Last  Test          Frequency    Reason                     Performed    Due Date  --------------------------------------------------------------------------------    CMP.........  12 months..  atorvastatin.............  05-   05-    Lipid Panel.  12 months..  atorvastatin.............  05-   05-    [employer] Embedded Care Due Messages. Reference number: [id number].   4/30/2024 12:04:03 AM CDT

## 2024-05-20 ENCOUNTER — PATIENT OUTREACH (OUTPATIENT)
Dept: ADMINISTRATIVE | Facility: HOSPITAL | Age: 59
End: 2024-05-20
Payer: COMMERCIAL

## 2024-06-03 ENCOUNTER — LAB VISIT (OUTPATIENT)
Dept: LAB | Facility: HOSPITAL | Age: 59
End: 2024-06-03
Attending: INTERNAL MEDICINE
Payer: COMMERCIAL

## 2024-06-03 ENCOUNTER — OFFICE VISIT (OUTPATIENT)
Dept: INTERNAL MEDICINE | Facility: CLINIC | Age: 59
End: 2024-06-03
Payer: COMMERCIAL

## 2024-06-03 VITALS
HEART RATE: 85 BPM | BODY MASS INDEX: 23.42 KG/M2 | HEIGHT: 70 IN | WEIGHT: 163.56 LBS | OXYGEN SATURATION: 98 % | DIASTOLIC BLOOD PRESSURE: 80 MMHG | SYSTOLIC BLOOD PRESSURE: 104 MMHG

## 2024-06-03 DIAGNOSIS — Z87.11 HISTORY OF GASTRIC ULCER: ICD-10-CM

## 2024-06-03 DIAGNOSIS — Z12.5 SCREENING FOR PROSTATE CANCER: ICD-10-CM

## 2024-06-03 DIAGNOSIS — E78.5 DYSLIPIDEMIA: ICD-10-CM

## 2024-06-03 DIAGNOSIS — Z00.00 ROUTINE PHYSICAL EXAMINATION: ICD-10-CM

## 2024-06-03 DIAGNOSIS — Z00.00 ROUTINE PHYSICAL EXAMINATION: Primary | ICD-10-CM

## 2024-06-03 LAB
ALBUMIN SERPL BCP-MCNC: 4.2 G/DL (ref 3.5–5.2)
ALP SERPL-CCNC: 60 U/L (ref 55–135)
ALT SERPL W/O P-5'-P-CCNC: 25 U/L (ref 10–44)
ANION GAP SERPL CALC-SCNC: 7 MMOL/L (ref 8–16)
AST SERPL-CCNC: 23 U/L (ref 10–40)
BASOPHILS # BLD AUTO: 0.02 K/UL (ref 0–0.2)
BASOPHILS NFR BLD: 0.3 % (ref 0–1.9)
BILIRUB SERPL-MCNC: 0.7 MG/DL (ref 0.1–1)
BUN SERPL-MCNC: 15 MG/DL (ref 6–20)
CALCIUM SERPL-MCNC: 10 MG/DL (ref 8.7–10.5)
CHLORIDE SERPL-SCNC: 103 MMOL/L (ref 95–110)
CHOLEST SERPL-MCNC: 167 MG/DL (ref 120–199)
CHOLEST/HDLC SERPL: 3 {RATIO} (ref 2–5)
CO2 SERPL-SCNC: 30 MMOL/L (ref 23–29)
COMPLEXED PSA SERPL-MCNC: 0.88 NG/ML (ref 0–4)
CREAT SERPL-MCNC: 0.9 MG/DL (ref 0.5–1.4)
DIFFERENTIAL METHOD BLD: ABNORMAL
EOSINOPHIL # BLD AUTO: 0.1 K/UL (ref 0–0.5)
EOSINOPHIL NFR BLD: 1 % (ref 0–8)
ERYTHROCYTE [DISTWIDTH] IN BLOOD BY AUTOMATED COUNT: 13 % (ref 11.5–14.5)
EST. GFR  (NO RACE VARIABLE): >60 ML/MIN/1.73 M^2
GLUCOSE SERPL-MCNC: 102 MG/DL (ref 70–110)
HCT VFR BLD AUTO: 46.4 % (ref 40–54)
HDLC SERPL-MCNC: 56 MG/DL (ref 40–75)
HDLC SERPL: 33.5 % (ref 20–50)
HGB BLD-MCNC: 15.7 G/DL (ref 14–18)
HIV 1+2 AB+HIV1 P24 AG SERPL QL IA: NORMAL
IMM GRANULOCYTES # BLD AUTO: 0.01 K/UL (ref 0–0.04)
IMM GRANULOCYTES NFR BLD AUTO: 0.2 % (ref 0–0.5)
LDLC SERPL CALC-MCNC: 88.8 MG/DL (ref 63–159)
LYMPHOCYTES # BLD AUTO: 1.3 K/UL (ref 1–4.8)
LYMPHOCYTES NFR BLD: 21.9 % (ref 18–48)
MCH RBC QN AUTO: 30.7 PG (ref 27–31)
MCHC RBC AUTO-ENTMCNC: 33.8 G/DL (ref 32–36)
MCV RBC AUTO: 91 FL (ref 82–98)
MONOCYTES # BLD AUTO: 0.2 K/UL (ref 0.3–1)
MONOCYTES NFR BLD: 3.6 % (ref 4–15)
NEUTROPHILS # BLD AUTO: 4.4 K/UL (ref 1.8–7.7)
NEUTROPHILS NFR BLD: 73 % (ref 38–73)
NONHDLC SERPL-MCNC: 111 MG/DL
NRBC BLD-RTO: 0 /100 WBC
PLATELET # BLD AUTO: 248 K/UL (ref 150–450)
PMV BLD AUTO: 9.9 FL (ref 9.2–12.9)
POTASSIUM SERPL-SCNC: 4.8 MMOL/L (ref 3.5–5.1)
PROT SERPL-MCNC: 7.3 G/DL (ref 6–8.4)
RBC # BLD AUTO: 5.11 M/UL (ref 4.6–6.2)
SODIUM SERPL-SCNC: 140 MMOL/L (ref 136–145)
TRIGL SERPL-MCNC: 111 MG/DL (ref 30–150)
WBC # BLD AUTO: 6.08 K/UL (ref 3.9–12.7)

## 2024-06-03 PROCEDURE — 3074F SYST BP LT 130 MM HG: CPT | Mod: CPTII,S$GLB,, | Performed by: INTERNAL MEDICINE

## 2024-06-03 PROCEDURE — 80053 COMPREHEN METABOLIC PANEL: CPT | Performed by: INTERNAL MEDICINE

## 2024-06-03 PROCEDURE — 99396 PREV VISIT EST AGE 40-64: CPT | Mod: S$GLB,,, | Performed by: INTERNAL MEDICINE

## 2024-06-03 PROCEDURE — 84153 ASSAY OF PSA TOTAL: CPT | Performed by: INTERNAL MEDICINE

## 2024-06-03 PROCEDURE — 1160F RVW MEDS BY RX/DR IN RCRD: CPT | Mod: CPTII,S$GLB,, | Performed by: INTERNAL MEDICINE

## 2024-06-03 PROCEDURE — 99999 PR PBB SHADOW E&M-EST. PATIENT-LVL III: CPT | Mod: PBBFAC,,, | Performed by: INTERNAL MEDICINE

## 2024-06-03 PROCEDURE — 85025 COMPLETE CBC W/AUTO DIFF WBC: CPT | Performed by: INTERNAL MEDICINE

## 2024-06-03 PROCEDURE — 87389 HIV-1 AG W/HIV-1&-2 AB AG IA: CPT | Performed by: INTERNAL MEDICINE

## 2024-06-03 PROCEDURE — 80061 LIPID PANEL: CPT | Performed by: INTERNAL MEDICINE

## 2024-06-03 PROCEDURE — 3079F DIAST BP 80-89 MM HG: CPT | Mod: CPTII,S$GLB,, | Performed by: INTERNAL MEDICINE

## 2024-06-03 PROCEDURE — 36415 COLL VENOUS BLD VENIPUNCTURE: CPT | Performed by: INTERNAL MEDICINE

## 2024-06-03 PROCEDURE — 1159F MED LIST DOCD IN RCRD: CPT | Mod: CPTII,S$GLB,, | Performed by: INTERNAL MEDICINE

## 2024-06-03 PROCEDURE — 3008F BODY MASS INDEX DOCD: CPT | Mod: CPTII,S$GLB,, | Performed by: INTERNAL MEDICINE

## 2024-06-03 RX ORDER — ATORVASTATIN CALCIUM 20 MG/1
20 TABLET, FILM COATED ORAL NIGHTLY
Qty: 90 TABLET | Refills: 3 | Status: SHIPPED | OUTPATIENT
Start: 2024-06-03

## 2024-06-03 NOTE — PROGRESS NOTES
Subjective:       Patient ID: Alonzo Turk is a 59 y.o. male.    Chief Complaint: Annual Exam    HPI doing well.  Would like update refill on Atorvastatin.  He would like to get some blood work done today.  Colon screening is up-to-date.  He denies any GI or  complaints.  Family history reviewed, no change.         Review of Systems   Constitutional:  Negative for activity change, chills, fatigue, fever and unexpected weight change.   HENT:  Negative for hearing loss, nosebleeds, rhinorrhea and trouble swallowing.    Eyes:  Negative for pain, discharge and visual disturbance.   Respiratory:  Negative for cough, chest tightness, shortness of breath and wheezing.    Cardiovascular:  Negative for chest pain and palpitations.   Gastrointestinal:  Negative for abdominal pain, blood in stool, constipation, diarrhea, nausea and vomiting.   Endocrine: Negative for polydipsia and polyuria.   Genitourinary:  Negative for difficulty urinating, hematuria and urgency.   Musculoskeletal:  Negative for arthralgias, back pain, joint swelling and neck pain.   Integumentary:  Negative for rash.   Neurological:  Negative for dizziness, weakness and headaches.   Hematological:  Does not bruise/bleed easily.   Psychiatric/Behavioral:  Negative for confusion, dysphoric mood and sleep disturbance.            Past Medical History:   Diagnosis Date    Gastric ulcer     Hyperlipidemia      Past Surgical History:   Procedure Laterality Date    ESOPHAGOGASTRODUODENOSCOPY N/A 1/4/2019    Procedure: ESOPHAGOGASTRODUODENOSCOPY (EGD);  Surgeon: Kodak Freeman MD;  Location: [address];  Service: Endoscopy;  Laterality: N/A;  6 months ago pt had outside EGD with multiple ulcers in the stomach. Has been on PPI 6 months. GI rec repeat scope to document healing. Pt was asymptomatic at time .      Patient Active Problem List   Diagnosis    Multiple gastric ulcers    Dyslipidemia    Left arm pain    History of gastric ulcer    DDD (degenerative disc  disease), cervical    Lateral epicondylitis of left elbow    Skin lump of arm, left    Left elbow pain    Decreased strength of upper extremity    Decreased ROM of neck        Objective:      Physical Exam  Constitutional:       General: He is not in acute distress.     Appearance: He is well-developed.   HENT:      Head: Normocephalic and atraumatic.      Right Ear: Tympanic membrane, ear canal and external ear normal.      Left Ear: Tympanic membrane, ear canal and external ear normal.      Mouth/Throat:      Pharynx: No oropharyngeal exudate or posterior oropharyngeal erythema.   Eyes:      General: No scleral icterus.     Conjunctiva/sclera: Conjunctivae normal.      Pupils: Pupils are equal, round, and reactive to light.   Neck:      Thyroid: No thyromegaly.      Comments: No supraclavicular nodes palpated  Cardiovascular:      Rate and Rhythm: Normal rate and regular rhythm.      Pulses: Normal pulses.      Heart sounds: Normal heart sounds. No murmur heard.  Pulmonary:      Effort: Pulmonary effort is normal.      Breath sounds: Normal breath sounds. No wheezing.   Abdominal:      General: Bowel sounds are normal.      Palpations: Abdomen is soft. There is no mass.      Tenderness: There is no abdominal tenderness.   Musculoskeletal:         General: No tenderness.      Cervical back: Normal range of motion and neck supple.      Right lower leg: No edema.      Left lower leg: No edema.   Lymphadenopathy:      Cervical: No cervical adenopathy.   Skin:     Coloration: Skin is not jaundiced or pale.   Neurological:      General: No focal deficit present.      Mental Status: He is alert and oriented to person, place, and time.   Psychiatric:         Mood and Affect: Mood normal.         Behavior: Behavior normal.         Assessment:       Problem List Items Addressed This Visit          Cardiac/Vascular    Dyslipidemia    Relevant Orders    Lipid Panel    Comprehensive Metabolic Panel    CBC Auto Differential     "   GI    History of gastric ulcer    Relevant Orders    PSA, Screening    Comprehensive Metabolic Panel    CBC Auto Differential     Other Visit Diagnoses       Routine physical examination    -  Primary    Relevant Orders    Lipid Panel    PSA, Screening    Comprehensive Metabolic Panel    CBC Auto Differential    HIV 1/2 Ag/Ab (4th Gen)    Screening for prostate cancer        Relevant Orders    PSA, Screening            Plan:         Alonzo was seen today for annual exam.    Diagnoses and all orders for this visit:    Routine physical examination  -     Lipid Panel; Future  -     PSA, Screening; Future  -     Comprehensive Metabolic Panel; Future  -     CBC Auto Differential; Future  -     HIV 1/2 Ag/Ab (4th Gen); Future    Dyslipidemia  -     Lipid Panel; Future  -     Comprehensive Metabolic Panel; Future  -     CBC Auto Differential; Future    History of gastric ulcer  -     PSA, Screening; Future  -     Comprehensive Metabolic Panel; Future  -     CBC Auto Differential; Future    Screening for prostate cancer  -     PSA, Screening; Future    Other orders  -     atorvastatin (LIPITOR) 20 MG tablet; Take 1 tablet (20 mg total) by mouth every evening.             Continue meds.  Review labs.  Call with any problems follow-up annually.         Portions of this note may have been created with voice recognition software. Occasional "wrong-word" or "sound-a-like" substitutions may have occurred due to the inherent limitations of voice recognition software. Please, read the note carefully and recognize, using context, where substitutions have occurred.  "

## 2024-06-10 ENCOUNTER — PATIENT MESSAGE (OUTPATIENT)
Dept: INTERNAL MEDICINE | Facility: CLINIC | Age: 59
End: 2024-06-10
Payer: COMMERCIAL

## 2025-08-02 NOTE — TELEPHONE ENCOUNTER
Care Due:                  Date            Visit Type   Department     Provider  --------------------------------------------------------------------------------                                MYCHART                              FOLLOWUP/OF  [facility] INTERNAL  Last Visit: 06-      FICE VISIT   MEDICINE       Benji Hudson  Next Visit: None Scheduled  None         None Found                                                            Last  Test          Frequency    Reason                     Performed    Due Date  --------------------------------------------------------------------------------    Office Visit  15 months..  atorvastatin.............  06- 08-    CMP.........  12 months..  atorvastatin.............  06- 05-    Lipid Panel.  12 months..  atorvastatin.............  06- 05-    [facility] Embedded Care Due Messages. Reference number: [id number].   8/02/2025 12:19:14 AM CDT

## 2025-08-02 NOTE — TELEPHONE ENCOUNTER
Refill Routing Note   Medication(s) are not appropriate for processing by [facility] for the following reason(s):        Required labs outdated    [facility] action(s):  Defer   Requires appointment : Yes   Requires labs : Yes             Appointments  past 12m or future 3m with PCP    Date Provider   Last Visit   6/3/2024 Benji Hudson MD   Next Visit   Visit date not found Benji Hudson MD   ED visits in past 90 days: 0        Note composed:12:27 PM 08/02/2025

## 2025-08-03 RX ORDER — ATORVASTATIN CALCIUM 20 MG/1
20 TABLET, FILM COATED ORAL NIGHTLY
Qty: 90 TABLET | Refills: 0 | Status: SHIPPED | OUTPATIENT
Start: 2025-08-03